# Patient Record
Sex: MALE | Race: WHITE | NOT HISPANIC OR LATINO | Employment: FULL TIME | URBAN - METROPOLITAN AREA
[De-identification: names, ages, dates, MRNs, and addresses within clinical notes are randomized per-mention and may not be internally consistent; named-entity substitution may affect disease eponyms.]

---

## 2017-11-29 ENCOUNTER — HOSPITAL ENCOUNTER (EMERGENCY)
Facility: HOSPITAL | Age: 37
Discharge: HOME | End: 2017-11-29
Attending: EMERGENCY MEDICINE
Payer: MEDICAID

## 2017-11-29 VITALS
RESPIRATION RATE: 18 BRPM | HEART RATE: 110 BPM | DIASTOLIC BLOOD PRESSURE: 103 MMHG | HEIGHT: 72 IN | WEIGHT: 270 LBS | TEMPERATURE: 97 F | BODY MASS INDEX: 36.57 KG/M2 | OXYGEN SATURATION: 95 % | SYSTOLIC BLOOD PRESSURE: 159 MMHG

## 2017-11-29 DIAGNOSIS — R52 PAIN: ICD-10-CM

## 2017-11-29 DIAGNOSIS — S93.401A SPRAIN OF RIGHT ANKLE, UNSPECIFIED LIGAMENT, INITIAL ENCOUNTER: Primary | ICD-10-CM

## 2017-11-29 DIAGNOSIS — M25.571 ACUTE RIGHT ANKLE PAIN: ICD-10-CM

## 2017-11-29 PROCEDURE — 25000003 PHARM REV CODE 250: Performed by: NURSE PRACTITIONER

## 2017-11-29 PROCEDURE — 29515 APPLICATION SHORT LEG SPLINT: CPT | Mod: RT

## 2017-11-29 PROCEDURE — 99283 EMERGENCY DEPT VISIT LOW MDM: CPT | Mod: 25

## 2017-11-29 RX ORDER — HYDROCODONE BITARTRATE AND ACETAMINOPHEN 5; 325 MG/1; MG/1
1 TABLET ORAL
Status: COMPLETED | OUTPATIENT
Start: 2017-11-29 | End: 2017-11-29

## 2017-11-29 RX ORDER — DICLOFENAC SODIUM 50 MG/1
50 TABLET, DELAYED RELEASE ORAL 3 TIMES DAILY PRN
Qty: 20 TABLET | Refills: 0 | Status: ON HOLD | OUTPATIENT
Start: 2017-11-29 | End: 2022-06-01

## 2017-11-29 RX ORDER — TRAMADOL HYDROCHLORIDE 50 MG/1
50 TABLET ORAL EVERY 6 HOURS PRN
Qty: 12 TABLET | Refills: 0 | Status: SHIPPED | OUTPATIENT
Start: 2017-11-29 | End: 2017-12-09

## 2017-11-29 RX ADMIN — HYDROCODONE BITARTRATE AND ACETAMINOPHEN 1 TABLET: 5; 325 TABLET ORAL at 10:11

## 2017-11-30 NOTE — ED PROVIDER NOTES
"SCRIBE #1 NOTE: I, Douglas Rios, am scribing for, and in the presence of, Stoney Villanueva NP. I have scribed the entire note.      History      Chief Complaint   Patient presents with    Ankle Pain     right ankle pain, reports rolling ankle on bottom step        Review of patient's allergies indicates:   Allergen Reactions    Phenergan [promethazine]      "makes my whole body tense up"        HPI   HPI    11/29/2017, 8:58 PM   History obtained from the patient      History of Present Illness: Cl Saunders is a 37 y.o. male patient who presents to the Emergency Department for an evaluation of right ankle pain which onset suddenly today. Symptoms are constant and moderate in severity. Exacerbated by bearing weight and relieved by nothing. Patient denies any weakness/numbness, gait problem, joint swelling, and all other sxs at this time. No further complaints or concerns at this time.       Arrival mode: Personal vehicle    PCP: Primary Doctor No       Past Medical History:  Past Medical History:   Diagnosis Date    H/O ETOH abuse        Past Surgical History:  Past Surgical History:   Procedure Laterality Date    TONSILLECTOMY           Family History:  Family History   Problem Relation Age of Onset    Hypertension Father     Hypertension Maternal Grandmother     Hypertension Maternal Grandfather        Social History:  Social History     Social History Main Topics    Smoking status: Never Smoker    Smokeless tobacco: Current User     Types: Chew    Alcohol use Yes      Comment: "at least a pint or more every day" - 1.5 gallon daily    Drug use:      Types: Marijuana      Comment: "sometimes marijuana"    Sexual activity: Not on file       ROS   Review of Systems   Constitutional: Negative for chills and fever.   HENT: Negative for congestion and sore throat.    Respiratory: Negative for cough and shortness of breath.    Cardiovascular: Negative for chest pain and leg swelling.   Gastrointestinal: " Negative for abdominal pain, nausea and vomiting.   Genitourinary: Negative for dysuria and hematuria.   Musculoskeletal: Positive for arthralgias (Right ankle). Negative for back pain, gait problem and joint swelling.   Skin: Negative for rash and wound.   Neurological: Negative for weakness, light-headedness, numbness and headaches.   Hematological: Does not bruise/bleed easily.     Physical Exam      Initial Vitals [11/29/17 2054]   BP Pulse Resp Temp SpO2   (!) 159/103 110 18 97.3 °F (36.3 °C) 95 %      MAP       121.67          Physical Exam  Nursing Notes and Vital Signs Reviewed.  Constitutional: Patient is in no acute distress. Well-developed and well-nourished.  Head: Atraumatic. Normocephalic.  Eyes: PERRL. EOM intact. Conjunctivae are not pale. No scleral icterus.  ENT: Mucous membranes are moist.   Neck: Supple. Full ROM. No lymphadenopathy.  Cardiovascular: Regular rate. Regular rhythm.  Pulmonary/Chest: No respiratory distress.   Abdominal: Soft and non-distended.   Musculoskeletal: Moves all extremities. No obvious deformities. No edema. No calf tenderness***.  Skin: Warm and dry.  Neurological:  Alert, awake, and appropriate.  Normal speech.  No acute focal neurological deficits are appreciated.  Psychiatric: Normal affect. Good eye contact. Appropriate in content.    ED Course    Procedures  ED Vital Signs:  Vitals:    11/29/17 2054   BP: (!) 159/103   Pulse: 110   Resp: 18   Temp: 97.3 °F (36.3 °C)   TempSrc: Oral   SpO2: 95%   Weight: 122.5 kg (270 lb)   Height: 6' (1.829 m)       Abnormal Lab Results:  Labs Reviewed - No data to display     All Lab Results:  ***    Imaging Results:  Imaging Results    None                 The Emergency Provider reviewed the vital signs and test results, which are outlined above.    ED Discussion     ***    ED Medication(s):  Medications - No data to display    New Prescriptions    No medications on file             Medical Decision Making              Scribe  Attestation:   Scribe #1: I performed the above scribed service and the documentation accurately describes the services I performed. I attest to the accuracy of the note.    Attending:   Physician Attestation Statement for Scribe #1: I, Stoney Villanueva NP, personally performed the services described in this documentation, as scribed by Douglas Rios, in my presence, and it is both accurate and complete.          Clinical Impression       ICD-10-CM ICD-9-CM   1. Pain R52 780.96

## 2017-11-30 NOTE — ED PROVIDER NOTES
"SCRIBE #1 NOTE: I, Douglas Rios, am scribing for, and in the presence of, Stoney Villanueva NP. I have scribed the entire note.      History      Chief Complaint   Patient presents with    Ankle Pain     right ankle pain, reports rolling ankle on bottom step        Review of patient's allergies indicates:   Allergen Reactions    Phenergan [promethazine]      "makes my whole body tense up"        HPI   HPI    11/29/2017, 9:53 PM   History obtained from the patient      History of Present Illness: Cl Saunders is a 37 y.o. male patient who presents to the Emergency Department for an evaluation of right ankle pain which onset suddenly today. Symptoms are constant and moderate in severity. Exacerbated by bearing weight and relieved by nothing. Patient denies any weakness/numbness, gait problem, joint swelling, and all other sxs at this time. No further complaints or concerns at this time.      Arrival mode: Personal vehicle    PCP: Primary Doctor No       Past Medical History:  Past Medical History:   Diagnosis Date    H/O ETOH abuse        Past Surgical History:  Past Surgical History:   Procedure Laterality Date    TONSILLECTOMY           Family History:  Family History   Problem Relation Age of Onset    Hypertension Father     Hypertension Maternal Grandmother     Hypertension Maternal Grandfather        Social History:  Social History     Social History Main Topics    Smoking status: Never Smoker    Smokeless tobacco: Current User     Types: Chew    Alcohol use Yes      Comment: "at least a pint or more every day" - 1.5 gallon daily    Drug use:      Types: Marijuana      Comment: "sometimes marijuana"    Sexual activity: Unknown       ROS   Review of Systems   Constitutional: Negative for chills and fever.   HENT: Negative for congestion and sore throat.    Respiratory: Negative for cough and shortness of breath.    Cardiovascular: Negative for chest pain.   Gastrointestinal: Negative for abdominal " pain, nausea and vomiting.   Genitourinary: Negative for dysuria and hematuria.   Musculoskeletal: Positive for arthralgias. Negative for back pain, gait problem and joint swelling.   Skin: Negative for rash and wound.   Neurological: Negative for weakness, light-headedness, numbness and headaches.   Hematological: Does not bruise/bleed easily.     Physical Exam      Initial Vitals [11/29/17 2054]   BP Pulse Resp Temp SpO2   (!) 159/103 110 18 97.3 °F (36.3 °C) 95 %      MAP       121.67          Physical Exam  Nursing Notes and Vital Signs Reviewed.  Constitutional: Patient is in no acute distress. Well-developed and well-nourished.  Head: Atraumatic. Normocephalic.  Eyes: PERRL. EOM intact. Conjunctivae are not pale. No scleral icterus.  ENT: Mucous membranes are moist. Oropharynx is clear and symmetric.    Neck: Supple. Full ROM. No lymphadenopathy.  Cardiovascular: Regular rate. Regular rhythm.  Pulmonary/Chest: No respiratory distress.   Abdominal: Soft and non-distended.    Musculoskeletal: Moves all extremities. No obvious deformities.   Right Ankle:  Negative Reyna's test. Swelling to right lateral malleolus with tenderness.   He is able to bear weight.   Ankle dorsiflexion and ankle plantar flexion are intact.  Intact sensation to light touch. Distal capillary refill takes less than 2 seconds.  PT and DP pulses are 2+ bilaterally.  Skin: Warm and dry.  Neurological:  Alert, awake, and appropriate.  Normal speech.  No acute focal neurological deficits are appreciated.  Psychiatric: Normal affect. Good eye contact. Appropriate in content.    ED Course    Orthopedic Injury  Date/Time: 11/29/2017 9:55 PM  Performed by: CARLEY CUMMINGS  Authorized by: CARLEY CUMMINGS     Injury:     Injury location:  Ankle    Location details:  Right ankle    Injury type:  Soft tissue      Pre-procedure assessment:     Neurovascular status: Neurovascularly intact      Distal perfusion: normal      Neurological function: normal       Range of motion: normal        Selections made in this section will also lock the Injury type section above.:     Immobilization:  Brace (Walking boot)  Post-procedure assessment:     Neurovascular status: Neurovascularly intact      Patient tolerance:  Patient tolerated the procedure well with no immediate complications      ED Vital Signs:  Vitals:    11/29/17 2054   BP: (!) 159/103   Pulse: 110   Resp: 18   Temp: 97.3 °F (36.3 °C)   TempSrc: Oral   SpO2: 95%   Weight: 122.5 kg (270 lb)   Height: 6' (1.829 m)       Imaging Results:  Imaging Results          X-Ray Ankle Complete Right (Final result)  Result time 11/29/17 21:20:40    Final result by Cl Lemus III, MD (11/29/17 21:20:40)                 Impression:     Soft tissue swelling, greatest laterally. No acute bony abnormality suggested.      Electronically signed by: CL LEMUS MD  Date:     11/29/17  Time:    21:20              Narrative:    Right ankle x-ray, 3 views.    Clinical indication: Right ankle pain.    Soft tissue swelling, greatest laterally. No fracture. No dislocation.                                      The Emergency Provider reviewed the vital signs and test results, which are outlined above.    ED Discussion     9:58 PM: Reassessed pt at this time. Pt is awake, alert, and in NAD. Pt states his condition has improved at this time. Discussed with pt all pertinent ED information and results. Discussed pt dx and plan of tx. Gave pt all f/u and return to the ED instructions. All questions and concerns were addressed at this time. Pt expresses understanding of information and instructions, and is comfortable with plan to discharge. Pt is stable for discharge.    I discussed with patient and/or family/caretaker that evaluation in the ED does not suggest any emergent or life threatening medical conditions requiring immediate intervention beyond what was provided in the ED, and I believe patient is safe for discharge.   Regardless, an unremarkable evaluation in the ED does not preclude the development or presence of a serious of life threatening condition. As such, patient was instructed to return immediately for any worsening or change in current symptoms.      ED Medication(s):  Medications   hydrocodone-acetaminophen 5-325mg per tablet 1 tablet (1 tablet Oral Given 11/29/17 2205)       Discharge Medication List as of 11/29/2017  9:59 PM      START taking these medications    Details   diclofenac (VOLTAREN) 50 MG EC tablet Take 1 tablet (50 mg total) by mouth 3 (three) times daily as needed., Starting Wed 11/29/2017, Print      traMADol (ULTRAM) 50 mg tablet Take 1 tablet (50 mg total) by mouth every 6 (six) hours as needed for Pain., Starting Wed 11/29/2017, Until Sat 12/9/2017, Print             Follow-up Information     [facility]    Specialty:  Emergency Medicine  Why:  As needed, If symptoms worsen  Contact information:  [address] [facility] [address] [phone]           [facility]. Schedule an appointment as soon as possible for a visit in 2 days.    Specialty:  Orthopedics  Contact information:  [address] [facility] [address] [phone]  Additional information:  (off [facility]) 1st floor                   Medical Decision Making    Medical Decision Making:   Clinical Tests:   Radiological Study: Ordered and Reviewed           Scribe Attestation:   Scribe #1: I performed the above scribed service and the documentation accurately describes the services I performed. I attest to the accuracy of the note.    Attending:   Physician Attestation Statement for Scribe #1: I, Stoney Villanueva NP, personally performed the services described in this documentation, as scribed by Douglas Rios, in my presence, and it is both accurate and complete.          Clinical Impression       ICD-10-CM ICD-9-CM   1. Sprain of right ankle, unspecified ligament,  initial encounter S93.401A 845.00   2. Pain R52 780.96   3. Acute right ankle pain M25.571 719.47     338.19       Disposition:   Disposition: Discharged  Condition: Stable         Stoney Villanueva NP  11/30/17 0157

## 2017-12-03 ENCOUNTER — HOSPITAL ENCOUNTER (EMERGENCY)
Facility: HOSPITAL | Age: 37
Discharge: HOME | End: 2017-12-04
Attending: EMERGENCY MEDICINE
Payer: MEDICAID

## 2017-12-03 DIAGNOSIS — R19.7 DIARRHEA, UNSPECIFIED TYPE: ICD-10-CM

## 2017-12-03 DIAGNOSIS — E87.6 HYPOKALEMIA: ICD-10-CM

## 2017-12-03 DIAGNOSIS — R11.2 NON-INTRACTABLE VOMITING WITH NAUSEA, UNSPECIFIED VOMITING TYPE: Primary | ICD-10-CM

## 2017-12-03 LAB
ALBUMIN SERPL BCP-MCNC: 3.6 G/DL
ALP SERPL-CCNC: 68 U/L
ALT SERPL W/O P-5'-P-CCNC: 124 U/L
ANION GAP SERPL CALC-SCNC: 13 MMOL/L
AST SERPL-CCNC: 41 U/L
BASOPHILS # BLD AUTO: 0.01 K/UL
BASOPHILS NFR BLD: 0.1 %
BILIRUB SERPL-MCNC: 1.4 MG/DL
BUN SERPL-MCNC: 12 MG/DL
CALCIUM SERPL-MCNC: 9.2 MG/DL
CHLORIDE SERPL-SCNC: 96 MMOL/L
CO2 SERPL-SCNC: 23 MMOL/L
CREAT SERPL-MCNC: 1.1 MG/DL
DIFFERENTIAL METHOD: ABNORMAL
EOSINOPHIL # BLD AUTO: 0 K/UL
EOSINOPHIL NFR BLD: 0.1 %
ERYTHROCYTE [DISTWIDTH] IN BLOOD BY AUTOMATED COUNT: 12.8 %
EST. GFR  (AFRICAN AMERICAN): >60 ML/MIN/1.73 M^2
EST. GFR  (NON AFRICAN AMERICAN): >60 ML/MIN/1.73 M^2
FLUAV AG SPEC QL IA: NEGATIVE
FLUBV AG SPEC QL IA: NEGATIVE
GLUCOSE SERPL-MCNC: 172 MG/DL
HCT VFR BLD AUTO: 48.9 %
HGB BLD-MCNC: 17.7 G/DL
LYMPHOCYTES # BLD AUTO: 0.5 K/UL
LYMPHOCYTES NFR BLD: 5.8 %
MCH RBC QN AUTO: 33.6 PG
MCHC RBC AUTO-ENTMCNC: 36.2 G/DL
MCV RBC AUTO: 93 FL
MONOCYTES # BLD AUTO: 1.1 K/UL
MONOCYTES NFR BLD: 12.5 %
NEUTROPHILS # BLD AUTO: 7.2 K/UL
NEUTROPHILS NFR BLD: 81.5 %
PLATELET # BLD AUTO: 113 K/UL
PMV BLD AUTO: 10.9 FL
POTASSIUM SERPL-SCNC: 2.8 MMOL/L
PROT SERPL-MCNC: 8.3 G/DL
RBC # BLD AUTO: 5.27 M/UL
SODIUM SERPL-SCNC: 132 MMOL/L
SPECIMEN SOURCE: NORMAL
WBC # BLD AUTO: 8.82 K/UL

## 2017-12-03 PROCEDURE — 85025 COMPLETE CBC W/AUTO DIFF WBC: CPT

## 2017-12-03 PROCEDURE — 96361 HYDRATE IV INFUSION ADD-ON: CPT

## 2017-12-03 PROCEDURE — 87400 INFLUENZA A/B EACH AG IA: CPT | Mod: 59

## 2017-12-03 PROCEDURE — 63600175 PHARM REV CODE 636 W HCPCS: Performed by: EMERGENCY MEDICINE

## 2017-12-03 PROCEDURE — 25000003 PHARM REV CODE 250: Performed by: EMERGENCY MEDICINE

## 2017-12-03 PROCEDURE — 80053 COMPREHEN METABOLIC PANEL: CPT

## 2017-12-03 PROCEDURE — 99283 EMERGENCY DEPT VISIT LOW MDM: CPT | Mod: 25

## 2017-12-03 PROCEDURE — 96374 THER/PROPH/DIAG INJ IV PUSH: CPT

## 2017-12-03 RX ORDER — ONDANSETRON 2 MG/ML
4 INJECTION INTRAMUSCULAR; INTRAVENOUS
Status: COMPLETED | OUTPATIENT
Start: 2017-12-03 | End: 2017-12-03

## 2017-12-03 RX ORDER — DEXTROAMPHETAMINE SACCHARATE, AMPHETAMINE ASPARTATE, DEXTROAMPHETAMINE SULFATE AND AMPHETAMINE SULFATE 7.5; 7.5; 7.5; 7.5 MG/1; MG/1; MG/1; MG/1
TABLET ORAL 2 TIMES DAILY
Status: ON HOLD | COMMUNITY
End: 2022-06-01

## 2017-12-03 RX ORDER — POTASSIUM CHLORIDE 20 MEQ/1
40 TABLET, EXTENDED RELEASE ORAL
Status: COMPLETED | OUTPATIENT
Start: 2017-12-03 | End: 2017-12-03

## 2017-12-03 RX ORDER — ONDANSETRON 4 MG/1
4 TABLET, ORALLY DISINTEGRATING ORAL EVERY 6 HOURS PRN
Qty: 15 TABLET | Refills: 0 | Status: ON HOLD | OUTPATIENT
Start: 2017-12-03 | End: 2022-06-01

## 2017-12-03 RX ADMIN — SODIUM CHLORIDE 1000 ML: 0.9 INJECTION, SOLUTION INTRAVENOUS at 10:12

## 2017-12-03 RX ADMIN — POTASSIUM CHLORIDE 40 MEQ: 1500 TABLET, EXTENDED RELEASE ORAL at 11:12

## 2017-12-03 RX ADMIN — ONDANSETRON HYDROCHLORIDE 4 MG: 2 INJECTION, SOLUTION INTRAMUSCULAR; INTRAVENOUS at 10:12

## 2017-12-04 VITALS
SYSTOLIC BLOOD PRESSURE: 132 MMHG | TEMPERATURE: 99 F | RESPIRATION RATE: 18 BRPM | BODY MASS INDEX: 35.34 KG/M2 | DIASTOLIC BLOOD PRESSURE: 81 MMHG | WEIGHT: 260.94 LBS | HEART RATE: 98 BPM | OXYGEN SATURATION: 98 % | HEIGHT: 72 IN

## 2017-12-14 ENCOUNTER — HOSPITAL ENCOUNTER (OUTPATIENT)
Dept: RADIOLOGY | Facility: HOSPITAL | Age: 37
Discharge: HOME | End: 2017-12-14
Attending: PHYSICIAN ASSISTANT
Payer: MEDICAID

## 2017-12-14 ENCOUNTER — OFFICE VISIT (OUTPATIENT)
Dept: ORTHOPEDICS | Facility: CLINIC | Age: 37
End: 2017-12-14
Payer: MEDICAID

## 2017-12-14 VITALS
HEIGHT: 72 IN | DIASTOLIC BLOOD PRESSURE: 84 MMHG | WEIGHT: 260.81 LBS | BODY MASS INDEX: 35.33 KG/M2 | SYSTOLIC BLOOD PRESSURE: 121 MMHG | HEART RATE: 101 BPM

## 2017-12-14 DIAGNOSIS — M25.571 ACUTE RIGHT ANKLE PAIN: ICD-10-CM

## 2017-12-14 DIAGNOSIS — M25.561 RIGHT MEDIAL KNEE PAIN: ICD-10-CM

## 2017-12-14 DIAGNOSIS — M25.571 ACUTE RIGHT ANKLE PAIN: Primary | ICD-10-CM

## 2017-12-14 PROCEDURE — 73560 X-RAY EXAM OF KNEE 1 OR 2: CPT | Mod: TC,PO,LT

## 2017-12-14 PROCEDURE — 73610 X-RAY EXAM OF ANKLE: CPT | Mod: 26,RT,, | Performed by: RADIOLOGY

## 2017-12-14 PROCEDURE — 73560 X-RAY EXAM OF KNEE 1 OR 2: CPT | Mod: 26,59,LT, | Performed by: RADIOLOGY

## 2017-12-14 PROCEDURE — 99999 PR PBB SHADOW E&M-EST. PATIENT-LVL IV: CPT | Mod: PBBFAC,,, | Performed by: PHYSICIAN ASSISTANT

## 2017-12-14 PROCEDURE — 99214 OFFICE O/P EST MOD 30 MIN: CPT | Mod: PBBFAC,25,PO | Performed by: PHYSICIAN ASSISTANT

## 2017-12-14 PROCEDURE — 73610 X-RAY EXAM OF ANKLE: CPT | Mod: TC,PO,RT

## 2017-12-14 PROCEDURE — 73562 X-RAY EXAM OF KNEE 3: CPT | Mod: 26,RT,, | Performed by: RADIOLOGY

## 2017-12-14 PROCEDURE — 99204 OFFICE O/P NEW MOD 45 MIN: CPT | Mod: S$PBB,,, | Performed by: PHYSICIAN ASSISTANT

## 2017-12-14 RX ORDER — DIAZEPAM 10 MG/1
10 TABLET ORAL ONCE
Qty: 1 TABLET | Refills: 0 | Status: SHIPPED | OUTPATIENT
Start: 2017-12-14 | End: 2018-01-02

## 2017-12-14 NOTE — PATIENT INSTRUCTIONS

## 2017-12-14 NOTE — PROGRESS NOTES
"CC: 38 y/o male right ankle pain    Date of Injury: 11/29/2017    HPI: On the date above, the patient rolled his ankle while stepping on uneven pavement.  A pop in his ankle.  He reported to the emergency room.  He continues to have a dull stabbing aching pain in the ankle.  Pain is 8 on a 10 scale.  He has tried NSAIDs with minimal relief.  Pain decreases at rest, pain increases with any activity.  He does work standing on it for long hours.    PMH:    Past Medical History:   Diagnosis Date    H/O ETOH abuse     Hypertension        PSH:    Past Surgical History:   Procedure Laterality Date    TONSILLECTOMY         Family Hx:    Family History   Problem Relation Age of Onset    Hypertension Father     Hypertension Maternal Grandmother     Hypertension Maternal Grandfather        Allergy:  Review of patient's allergies indicates:  No Known Allergies    Medication:    Current Outpatient Prescriptions:     dextroamphetamine-amphetamine (ADDERALL) 30 mg Tab, Take by mouth 2 (two) times daily., Disp: , Rfl:     diclofenac (VOLTAREN) 50 MG EC tablet, Take 1 tablet (50 mg total) by mouth 3 (three) times daily as needed., Disp: 20 tablet, Rfl: 0    LOSARTAN POTASSIUM (LOSARTAN ORAL), Take by mouth once daily. , Disp: , Rfl:     ondansetron (ZOFRAN-ODT) 4 MG TbDL, Take 1 tablet (4 mg total) by mouth every 6 (six) hours as needed., Disp: 15 tablet, Rfl: 0    amlodipine (NORVASC) 5 MG tablet, Take 1 tablet (5 mg total) by mouth once daily., Disp: 30 tablet, Rfl: 1    Social History:    Social History     Social History    Marital status:      Spouse name: N/A    Number of children: N/A    Years of education: N/A     Occupational History    [employer]      Social History Main Topics    Smoking status: Never Smoker    Smokeless tobacco: Current User     Types: Chew    Alcohol use 8.4 oz/week     14 Glasses of wine per week    Drug use: Yes     Types: Marijuana      Comment: "sometimes marijuana"    " Sexual activity: Not on file     Other Topics Concern    Not on file     Social History Narrative    Lives with spouse and children           Vitals:   /84 (BP Location: Left arm, Patient Position: Sitting, BP Method: Large (Automatic))   Pulse 101   Ht 6' (1.829 m)   Wt 118.3 kg (260 lb 12.9 oz)   BMI 35.37 kg/m²      ROS:  GENERAL: No fever, chills, fatigability or weight loss.  SKIN: No rashes, itching or changes in color or texture of skin.  HEAD: No headaches or recent head trauma.  EYES: Visual acuity fine. No photophobia, ocular pain or diplopia.  EARS: Denies ear pain, discharge or vertigo.  NOSE: No loss of smell, no epistaxis or postnasal drip.  MOUTH & THROAT: No hoarseness or change in voice. No excessive gum bleeding.  NODES: Denies swollen glands.  CHEST: Denies RIOS, cyanosis, wheezing, cough and sputum production.  CARDIOVASCULAR: Denies chest pain, PND, orthopnea or reduced exercise tolerance.  ABDOMEN: Appetite fine. No weight loss. Denies diarrhea, abdominal pain, hematemesis or blood in stool.  URINARY: No flank pain, dysuria or hematuria.  PERIPHERAL VASCULAR: No claudication or cyanosis.  NEUROLOGIC: No history of seizures, paralysis, alteration of gait or coordination.  MUSCULOSKELETAL: See HPI    PE:  APPEARANCE: Well nourished, well developed, in no acute distress.   HEAD: Normocephalic, atraumatic.  NEUROLOGIC: Cranial Nerves: II-XII grossly intact, also see MUSCULOSKELETAL  MUSCULOSKELETAL: .  Assessment:           Diagnosis:              1.Right ankle strain                 Diagnostic Studies  MRI-Yes  X-Ray-Yes agree with Findings: There is no radiographic evidence of acute osseous, articular, or soft tissue abnormality. Joint spaces are well preserved  EMG/NCV-No  Arthrogram-No  Bone Scan-No  CT Scan-No  Doppler-No  ESR-No  CRP-No  CBC with Diff-No   Rheumatoid/Arthritis Panel-No      Plan:                                                 1. PT-no                                                  2.OT-no                                          3.NSAID-no                                        4. Narcotics-no                                     5. Wound care-N/A                                 6. Rest-no                                           7. Surgery-no                                         8. JACQUELINE Hose-no                                    9. Anticoagulation therapy-no               10. Elevation-no                                     11. Crutches-no                                    12. Walker-no             13. Cane no                        14. Referral-no                                     15.Injection-no                            16. Splint   /    Cast   /   Cast Shoe-Yes  Ankle splint given            17. RICE            18. Follow up-  After MRI

## 2017-12-14 NOTE — LETTER
December 14, 2017      Dejan Stubbs MD  [address] [place] [address] [address] [place] [address]           [facility]  [address] [facility] [place] [address]  Phone: [phone]  Fax: [phone]          Patient: Cl Saunders   MR Number: [id number]   YOB: 1980   Date of Visit: 12/14/2017       Dear Dr. Dejan Stubbs:    Thank you for referring Cl Saunders to me for evaluation. Attached you will find relevant portions of my assessment and plan of care.    If you have questions, please do not hesitate to call me. I look forward to following Cl Saunders along with you.    Sincerely,    Matt Shane PA-C    Enclosure  CC:  No Recipients    If you would like to receive this communication electronically, please contact [email or web address] or [phone] to request more information on [employer] Link access.    For providers and/or their staff who would like to refer a patient to [facility], please contact us through our one-stop-shop provider referral line, [facility], at [phone].    If you feel you have received this communication in error or would no longer like to receive these types of communications, please e-mail [email or web address]

## 2017-12-22 ENCOUNTER — TELEPHONE (OUTPATIENT)
Dept: RADIOLOGY | Facility: HOSPITAL | Age: 37
End: 2017-12-22

## 2017-12-28 ENCOUNTER — HOSPITAL ENCOUNTER (OUTPATIENT)
Dept: RADIOLOGY | Facility: HOSPITAL | Age: 37
Discharge: HOME | End: 2017-12-28
Attending: PHYSICIAN ASSISTANT
Payer: MEDICAID

## 2017-12-28 DIAGNOSIS — M25.571 ACUTE RIGHT ANKLE PAIN: ICD-10-CM

## 2017-12-28 PROCEDURE — 73721 MRI JNT OF LWR EXTRE W/O DYE: CPT | Mod: TC,PO,RT

## 2017-12-28 PROCEDURE — 73721 MRI JNT OF LWR EXTRE W/O DYE: CPT | Mod: 26,RT,, | Performed by: RADIOLOGY

## 2018-01-02 ENCOUNTER — OFFICE VISIT (OUTPATIENT)
Dept: ORTHOPEDICS | Facility: CLINIC | Age: 38
End: 2018-01-02
Payer: MEDICAID

## 2018-01-02 VITALS
HEIGHT: 72 IN | DIASTOLIC BLOOD PRESSURE: 95 MMHG | HEART RATE: 66 BPM | SYSTOLIC BLOOD PRESSURE: 142 MMHG | WEIGHT: 260.81 LBS | BODY MASS INDEX: 35.33 KG/M2

## 2018-01-02 DIAGNOSIS — S96.911D: Primary | ICD-10-CM

## 2018-01-02 PROCEDURE — 99213 OFFICE O/P EST LOW 20 MIN: CPT | Mod: PBBFAC,PO | Performed by: PHYSICIAN ASSISTANT

## 2018-01-02 PROCEDURE — 99999 PR PBB SHADOW E&M-EST. PATIENT-LVL III: CPT | Mod: PBBFAC,,, | Performed by: PHYSICIAN ASSISTANT

## 2018-01-02 PROCEDURE — 99213 OFFICE O/P EST LOW 20 MIN: CPT | Mod: S$PBB,,, | Performed by: PHYSICIAN ASSISTANT

## 2018-01-02 RX ORDER — ZOLPIDEM TARTRATE 10 MG/1
10 TABLET ORAL NIGHTLY PRN
Refills: 2 | Status: ON HOLD | COMMUNITY
Start: 2017-11-04 | End: 2022-06-01

## 2018-01-02 RX ORDER — NABUMETONE 750 MG/1
750 TABLET, FILM COATED ORAL 2 TIMES DAILY PRN
Qty: 60 TABLET | Refills: 0 | Status: ON HOLD | OUTPATIENT
Start: 2018-01-02 | End: 2022-06-01

## 2018-01-02 NOTE — LETTER
January 2, 2018      [facility]  [address] [facility] [place] [address]  Phone: [phone]  Fax: [phone]       Patient: Cl Saunders   YOB: 1980  Date of Visit: 01/02/2018    To Whom It May Concern:    Constance Saunders  was at [facility] on 01/02/2018. He may return to work/school on  2 Jan 2018 with no restrictions. If you have any questions or concerns, or if I can be of further assistance, please do not hesitate to contact me.    Sincerely,        Matt Shane PA-C

## 2018-01-02 NOTE — PROGRESS NOTES
CC: 38 y/o male right ankle pain    Date of Injury: 11/29/2017    HPI: On the date above, the patient rolled his ankle while stepping on uneven pavement.  A pop in his ankle.  He reported to the emergency room.  He continues to have a dull stabbing aching pain in the ankle.  Pain is 8 on a 10 scale.  He has tried NSAIDs with minimal relief.  Pain decreases at rest, pain increases with any activity.  He does work standing on it for 10-14 hours per day.    PMH:    Past Medical History:   Diagnosis Date    H/O ETOH abuse     Hypertension        PSH:    Past Surgical History:   Procedure Laterality Date    TONSILLECTOMY         Family Hx:    Family History   Problem Relation Age of Onset    Hypertension Father     Hypertension Maternal Grandmother     Hypertension Maternal Grandfather        Allergy:  Review of patient's allergies indicates:  No Known Allergies    Medication:    Current Outpatient Prescriptions:     dextroamphetamine-amphetamine (ADDERALL) 30 mg Tab, Take by mouth 2 (two) times daily., Disp: , Rfl:     diclofenac (VOLTAREN) 50 MG EC tablet, Take 1 tablet (50 mg total) by mouth 3 (three) times daily as needed., Disp: 20 tablet, Rfl: 0    LOSARTAN POTASSIUM (LOSARTAN ORAL), Take by mouth once daily. , Disp: , Rfl:     ondansetron (ZOFRAN-ODT) 4 MG TbDL, Take 1 tablet (4 mg total) by mouth every 6 (six) hours as needed., Disp: 15 tablet, Rfl: 0    zolpidem (AMBIEN) 10 mg Tab, Take 10 mg by mouth nightly as needed., Disp: , Rfl: 2    amlodipine (NORVASC) 5 MG tablet, Take 1 tablet (5 mg total) by mouth once daily., Disp: 30 tablet, Rfl: 1    nabumetone (RELAFEN) 750 MG tablet, Take 1 tablet (750 mg total) by mouth 2 (two) times daily as needed for Pain., Disp: 60 tablet, Rfl: 0    Social History:    Social History     Social History    Marital status:      Spouse name: N/A    Number of children: N/A    Years of education: N/A     Occupational History    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      Social History  "Main Topics    Smoking status: Never Smoker    Smokeless tobacco: Current User     Types: Chew    Alcohol use 8.4 oz/week     14 Glasses of wine per week    Drug use: Yes     Types: Marijuana      Comment: "sometimes marijuana"    Sexual activity: Not on file     Other Topics Concern    Not on file     Social History Narrative    Lives with spouse and children           Vitals:   BP (!) 142/95 (BP Location: Right arm, Patient Position: Sitting, BP Method: Large (Automatic))   Pulse 66   Ht 6' (1.829 m)   Wt 118.3 kg (260 lb 12.9 oz)   BMI 35.37 kg/m²      ROS:  GENERAL: No fever, chills, fatigability or weight loss.  SKIN: No rashes, itching or changes in color or texture of skin.  HEAD: No headaches or recent head trauma.  EYES: Visual acuity fine. No photophobia, ocular pain or diplopia.  EARS: Denies ear pain, discharge or vertigo.  NOSE: No loss of smell, no epistaxis or postnasal drip.  MOUTH & THROAT: No hoarseness or change in voice. No excessive gum bleeding.  NODES: Denies swollen glands.  CHEST: Denies RIOS, cyanosis, wheezing, cough and sputum production.  CARDIOVASCULAR: Denies chest pain, PND, orthopnea or reduced exercise tolerance.  ABDOMEN: Appetite fine. No weight loss. Denies diarrhea, abdominal pain, hematemesis or blood in stool.  URINARY: No flank pain, dysuria or hematuria.  PERIPHERAL VASCULAR: No claudication or cyanosis.  NEUROLOGIC: No history of seizures, paralysis, alteration of gait or coordination.  MUSCULOSKELETAL: See HPI    PE:  APPEARANCE: Well nourished, well developed, in no acute distress.   HEAD: Normocephalic, atraumatic.  NEUROLOGIC: Cranial Nerves: II-XII grossly intact, also see MUSCULOSKELETAL  MUSCULOSKELETAL: .  Assessment:           Diagnosis:              1.Right ankle strain                 Diagnostic Studies  MRI-Yes agree with the findings of There is an ankle joint effusion present. No fracture or dislocation or contusion.. From normal fat is seen in the " sinus tarsi. Achilles tendon is intact. No evidence for plantar fasciitis. The visualized extensor and flexor tendons appear intact. Anterior and posterior talofibular ligaments appear intact. There is edema within the subcutaneous soft tissues about the ankle.    X-Ray-Yes agree with Findings: There is no radiographic evidence of acute osseous, articular, or soft tissue abnormality. Joint spaces are well preserved      Plan:                                                 1. PT-no                                                 2.OT-no                                          3.NSAID-yes, Relafen 750 mg twice a day as needed                                      4. Narcotics-no                                     5. Wound care-N/A                                 6. Rest-no                                           7. Surgery-no                                         8. JACQUELINE Hose-no                                    9. Anticoagulation therapy-no               10. Elevation-no                                     11. Crutches-no                                    12. Walker-no             13. Cane no                        14. Referral-no                                     15.Injection-no                            16. Splint   /    Cast   /   Cast Shoe-Yes  Ankle splint given            17. RICE            18. Follow up-  4 weeks for recheck

## 2018-01-30 ENCOUNTER — OFFICE VISIT (OUTPATIENT)
Dept: ORTHOPEDICS | Facility: CLINIC | Age: 38
End: 2018-01-30
Payer: MEDICAID

## 2018-01-30 VITALS
HEIGHT: 72 IN | RESPIRATION RATE: 18 BRPM | WEIGHT: 250 LBS | SYSTOLIC BLOOD PRESSURE: 144 MMHG | DIASTOLIC BLOOD PRESSURE: 88 MMHG | HEART RATE: 67 BPM | BODY MASS INDEX: 33.86 KG/M2

## 2018-01-30 DIAGNOSIS — S93.401A SPRAIN OF RIGHT ANKLE, UNSPECIFIED LIGAMENT, INITIAL ENCOUNTER: Primary | ICD-10-CM

## 2018-01-30 DIAGNOSIS — S86.311A STRAIN OF PERONEAL TENDON OF RIGHT FOOT, INITIAL ENCOUNTER: ICD-10-CM

## 2018-01-30 PROCEDURE — 99214 OFFICE O/P EST MOD 30 MIN: CPT | Mod: S$PBB,,, | Performed by: ORTHOPAEDIC SURGERY

## 2018-01-30 PROCEDURE — 99213 OFFICE O/P EST LOW 20 MIN: CPT | Mod: PBBFAC,PO | Performed by: ORTHOPAEDIC SURGERY

## 2018-01-30 PROCEDURE — 99999 PR PBB SHADOW E&M-EST. PATIENT-LVL III: CPT | Mod: PBBFAC,,, | Performed by: ORTHOPAEDIC SURGERY

## 2018-01-30 PROCEDURE — 3008F BODY MASS INDEX DOCD: CPT | Mod: ,,, | Performed by: ORTHOPAEDIC SURGERY

## 2018-01-31 PROBLEM — S93.401A SPRAIN OF RIGHT ANKLE: Status: ACTIVE | Noted: 2018-01-31

## 2018-01-31 PROBLEM — S86.311A STRAIN OF PERONEAL TENDON OF RIGHT FOOT: Status: ACTIVE | Noted: 2018-01-31

## 2018-01-31 NOTE — PATIENT INSTRUCTIONS
ACE Wrap  Minor muscle or joint injuries are often treated with an elastic bandage. The bandage provides support and compression to the injured area. An elastic bandage is a stretchy, rolled bandage. Elastic bandages range in width from 2 to 6 inches. They can be used for a variety of injuries. The bandages are often called ACE bandages, after the most common brand name.  If used correctly, elastic bandages help control swelling and ease pain. An elastic bandage is also a good reminder not to overuse the injured area. However, elastic bandages do not provide a lot of support and will not prevent reinjury.  Home care    To apply an elastic bandage:  · Check the skin before wrapping the injury. It should be clean, dry, and free of drainage.  · Start wrapping below the injury and work your way toward the body. For an ankle sprain, start wrapping around the foot and work up toward the calf. This will help control swelling.  · Overlap the edges of the bandage so it stays snuggly in place.  · Wrap the bandage firmly, but not too tightly. A tight bandage can increase swelling on either end of the bandage. Make sure the bandage is wrinkle free.  · Leave fingers and toes exposed.  · Secure ends of the bandage (even self-sticking ones) with clips or tape.  · Check frequently to ensure adequate circulation, especially in the fingers and toes. Loosen the bandage if there is local swelling, numbness, tingling, discomfort, coldness, or discoloration (skin pale or bluish in color).  · Rewrap the bandage as needed during the day. Reroll the bandage as you unwind it.  Continue using the elastic bandage until the pain and swelling are gone or as your healthcare provider advises.  If you have been told to ice the area, the ice can be secured in place with the elastic bandage. Wrap the ice pack with a thin towel to protect the skin. Do not put ice or an ice pack directly on the skin.  Ice the area for no more than 20 minutes at a  time.    Follow-up care  Follow up with your healthcare provider, as advised.  When to seek medical advice  Call your healthcare provider for any of the following:  · Pain and swelling that doesn't get better or gets worse  · Trouble moving injured area  · Skin discoloration, numbness, or tingling that doesnt go away after bandage is removed  Date Last Reviewed: 9/13/2015  © 1726-3280 [employer]. [address] [place] [address], [place] [address]. All rights reserved. This information is not intended as a substitute for professional medical care. Always follow your healthcare professional's instructions.

## 2018-08-27 ENCOUNTER — HOSPITAL ENCOUNTER (EMERGENCY)
Facility: HOSPITAL | Age: 38
Discharge: HOME | End: 2018-08-28
Attending: EMERGENCY MEDICINE
Payer: MEDICAID

## 2018-08-27 DIAGNOSIS — J40 BRONCHITIS: Primary | ICD-10-CM

## 2018-08-27 DIAGNOSIS — R05.9 COUGH: ICD-10-CM

## 2018-08-27 DIAGNOSIS — E86.0 DEHYDRATION: ICD-10-CM

## 2018-08-27 LAB
ALBUMIN SERPL BCP-MCNC: 4.7 G/DL
ALP SERPL-CCNC: 81 U/L
ALT SERPL W/O P-5'-P-CCNC: 190 U/L
ANION GAP SERPL CALC-SCNC: 15 MMOL/L
ANISOCYTOSIS BLD QL SMEAR: SLIGHT
APTT BLDCRRT: 27.5 SEC
AST SERPL-CCNC: 72 U/L
BASOPHILS # BLD AUTO: 0.01 K/UL
BASOPHILS NFR BLD: 0.1 %
BILIRUB SERPL-MCNC: 0.8 MG/DL
BILIRUB UR QL STRIP: NEGATIVE
BNP SERPL-MCNC: <10 PG/ML
BUN SERPL-MCNC: 15 MG/DL
CALCIUM SERPL-MCNC: 10.2 MG/DL
CHLORIDE SERPL-SCNC: 102 MMOL/L
CLARITY UR: CLEAR
CO2 SERPL-SCNC: 21 MMOL/L
COLOR UR: YELLOW
CREAT SERPL-MCNC: 0.9 MG/DL
DACRYOCYTES BLD QL SMEAR: ABNORMAL
DIFFERENTIAL METHOD: ABNORMAL
EOSINOPHIL # BLD AUTO: 0 K/UL
EOSINOPHIL NFR BLD: 0.1 %
ERYTHROCYTE [DISTWIDTH] IN BLOOD BY AUTOMATED COUNT: 13.4 %
EST. GFR  (AFRICAN AMERICAN): >60 ML/MIN/1.73 M^2
EST. GFR  (NON AFRICAN AMERICAN): >60 ML/MIN/1.73 M^2
FLUAV AG SPEC QL IA: NEGATIVE
FLUBV AG SPEC QL IA: NEGATIVE
GLUCOSE SERPL-MCNC: 103 MG/DL
GLUCOSE UR QL STRIP: NEGATIVE
HCT VFR BLD AUTO: 54.1 %
HGB BLD-MCNC: 20.1 G/DL
HGB UR QL STRIP: NEGATIVE
INR PPP: 1
KETONES UR QL STRIP: NEGATIVE
LACTATE SERPL-SCNC: 1.5 MMOL/L
LEUKOCYTE ESTERASE UR QL STRIP: NEGATIVE
LYMPHOCYTES # BLD AUTO: 1.3 K/UL
LYMPHOCYTES NFR BLD: 12.9 %
MCH RBC QN AUTO: 34.7 PG
MCHC RBC AUTO-ENTMCNC: 37.2 G/DL
MCV RBC AUTO: 93 FL
MONOCYTES # BLD AUTO: 0.7 K/UL
MONOCYTES NFR BLD: 6.8 %
NEUTROPHILS # BLD AUTO: 7.8 K/UL
NEUTROPHILS NFR BLD: 80.7 %
NITRITE UR QL STRIP: NEGATIVE
OVALOCYTES BLD QL SMEAR: ABNORMAL
PH UR STRIP: 6 [PH] (ref 5–8)
PLATELET # BLD AUTO: 200 K/UL
PLATELET BLD QL SMEAR: ABNORMAL
PMV BLD AUTO: 9.9 FL
POIKILOCYTOSIS BLD QL SMEAR: SLIGHT
POLYCHROMASIA BLD QL SMEAR: ABNORMAL
POTASSIUM SERPL-SCNC: 4 MMOL/L
PROCALCITONIN SERPL IA-MCNC: 0.06 NG/ML
PROT SERPL-MCNC: 8.8 G/DL
PROT UR QL STRIP: NEGATIVE
PROTHROMBIN TIME: 10.1 SEC
RBC # BLD AUTO: 5.8 M/UL
SODIUM SERPL-SCNC: 138 MMOL/L
SP GR UR STRIP: 1.02 (ref 1–1.03)
SPECIMEN SOURCE: NORMAL
STOMATOCYTES BLD QL SMEAR: PRESENT
TROPONIN I SERPL DL<=0.01 NG/ML-MCNC: 0.02 NG/ML
URN SPEC COLLECT METH UR: NORMAL
UROBILINOGEN UR STRIP-ACNC: NEGATIVE EU/DL
WBC # BLD AUTO: 9.72 K/UL

## 2018-08-27 PROCEDURE — 96365 THER/PROPH/DIAG IV INF INIT: CPT | Mod: 59

## 2018-08-27 PROCEDURE — 83605 ASSAY OF LACTIC ACID: CPT

## 2018-08-27 PROCEDURE — 84484 ASSAY OF TROPONIN QUANT: CPT

## 2018-08-27 PROCEDURE — 84145 PROCALCITONIN (PCT): CPT

## 2018-08-27 PROCEDURE — 99285 EMERGENCY DEPT VISIT HI MDM: CPT | Mod: 25

## 2018-08-27 PROCEDURE — 85610 PROTHROMBIN TIME: CPT

## 2018-08-27 PROCEDURE — 25000003 PHARM REV CODE 250: Performed by: EMERGENCY MEDICINE

## 2018-08-27 PROCEDURE — 83880 ASSAY OF NATRIURETIC PEPTIDE: CPT

## 2018-08-27 PROCEDURE — 25000242 PHARM REV CODE 250 ALT 637 W/ HCPCS: Performed by: EMERGENCY MEDICINE

## 2018-08-27 PROCEDURE — 96367 TX/PROPH/DG ADDL SEQ IV INF: CPT

## 2018-08-27 PROCEDURE — 94640 AIRWAY INHALATION TREATMENT: CPT

## 2018-08-27 PROCEDURE — 81003 URINALYSIS AUTO W/O SCOPE: CPT

## 2018-08-27 PROCEDURE — 63600175 PHARM REV CODE 636 W HCPCS: Performed by: EMERGENCY MEDICINE

## 2018-08-27 PROCEDURE — 85025 COMPLETE CBC W/AUTO DIFF WBC: CPT

## 2018-08-27 PROCEDURE — 87040 BLOOD CULTURE FOR BACTERIA: CPT | Mod: 59

## 2018-08-27 PROCEDURE — 93005 ELECTROCARDIOGRAM TRACING: CPT

## 2018-08-27 PROCEDURE — 25500020 PHARM REV CODE 255: Performed by: EMERGENCY MEDICINE

## 2018-08-27 PROCEDURE — 80053 COMPREHEN METABOLIC PANEL: CPT

## 2018-08-27 PROCEDURE — 87400 INFLUENZA A/B EACH AG IA: CPT

## 2018-08-27 PROCEDURE — 93010 ELECTROCARDIOGRAM REPORT: CPT | Mod: ,,, | Performed by: INTERNAL MEDICINE

## 2018-08-27 PROCEDURE — 85730 THROMBOPLASTIN TIME PARTIAL: CPT

## 2018-08-27 RX ORDER — IPRATROPIUM BROMIDE AND ALBUTEROL SULFATE 2.5; .5 MG/3ML; MG/3ML
3 SOLUTION RESPIRATORY (INHALATION)
Status: COMPLETED | OUTPATIENT
Start: 2018-08-27 | End: 2018-08-27

## 2018-08-27 RX ORDER — ACETAMINOPHEN 500 MG
1000 TABLET ORAL
Status: COMPLETED | OUTPATIENT
Start: 2018-08-27 | End: 2018-08-27

## 2018-08-27 RX ADMIN — AZITHROMYCIN MONOHYDRATE 500 MG: 500 INJECTION, POWDER, LYOPHILIZED, FOR SOLUTION INTRAVENOUS at 10:08

## 2018-08-27 RX ADMIN — ACETAMINOPHEN 1000 MG: 500 TABLET ORAL at 10:08

## 2018-08-27 RX ADMIN — SODIUM CHLORIDE 3612 ML: 0.9 INJECTION, SOLUTION INTRAVENOUS at 09:08

## 2018-08-27 RX ADMIN — IPRATROPIUM BROMIDE AND ALBUTEROL SULFATE 3 ML: .5; 3 SOLUTION RESPIRATORY (INHALATION) at 10:08

## 2018-08-27 RX ADMIN — IOHEXOL 100 ML: 350 INJECTION, SOLUTION INTRAVENOUS at 11:08

## 2018-08-27 RX ADMIN — CEFTRIAXONE 1 G: 1 INJECTION, SOLUTION INTRAVENOUS at 09:08

## 2018-08-28 VITALS
RESPIRATION RATE: 23 BRPM | BODY MASS INDEX: 35.93 KG/M2 | OXYGEN SATURATION: 90 % | DIASTOLIC BLOOD PRESSURE: 68 MMHG | HEIGHT: 72 IN | WEIGHT: 265.31 LBS | SYSTOLIC BLOOD PRESSURE: 112 MMHG | TEMPERATURE: 98 F | HEART RATE: 79 BPM

## 2018-08-28 LAB
BASOPHILS # BLD AUTO: 0.01 K/UL
BASOPHILS NFR BLD: 0.2 %
DIFFERENTIAL METHOD: ABNORMAL
EOSINOPHIL # BLD AUTO: 0 K/UL
EOSINOPHIL NFR BLD: 0.2 %
ERYTHROCYTE [DISTWIDTH] IN BLOOD BY AUTOMATED COUNT: 13.5 %
HCT VFR BLD AUTO: 46.6 %
HGB BLD-MCNC: 16.6 G/DL
LYMPHOCYTES # BLD AUTO: 1.1 K/UL
LYMPHOCYTES NFR BLD: 16.9 %
MCH RBC QN AUTO: 33.7 PG
MCHC RBC AUTO-ENTMCNC: 35.6 G/DL
MCV RBC AUTO: 95 FL
MONOCYTES # BLD AUTO: 0.6 K/UL
MONOCYTES NFR BLD: 9.5 %
NEUTROPHILS # BLD AUTO: 4.9 K/UL
NEUTROPHILS NFR BLD: 73.2 %
PLATELET # BLD AUTO: 148 K/UL
PMV BLD AUTO: 9.7 FL
RBC # BLD AUTO: 4.93 M/UL
WBC # BLD AUTO: 6.62 K/UL

## 2018-08-28 PROCEDURE — 85025 COMPLETE CBC W/AUTO DIFF WBC: CPT

## 2018-08-28 RX ORDER — LEVOFLOXACIN 500 MG/1
500 TABLET, FILM COATED ORAL DAILY
Qty: 10 TABLET | Refills: 0 | Status: SHIPPED | OUTPATIENT
Start: 2018-08-28 | End: 2018-09-07

## 2018-08-28 RX ORDER — ALBUTEROL SULFATE 90 UG/1
2 AEROSOL, METERED RESPIRATORY (INHALATION) EVERY 6 HOURS PRN
Qty: 18 G | Refills: 11 | Status: ON HOLD | OUTPATIENT
Start: 2018-08-28 | End: 2022-06-01

## 2018-08-28 NOTE — ED NOTES
Report taken from Katarzyna PETERSEN.  Patient AAo x 4 lying on stretcher.  Vitals stable.  Has nonproductive, hacking cough.  BBS coarse to auscultation.  On room air sats 92%.  Stach.  HR 105bpm.  3000ml fluid bolus infusing and antibx also infusing as ordered.  Call bell in reach.  Watching TV.  Temp = 98

## 2018-08-28 NOTE — ED PROVIDER NOTES
"SCRIBE #1 NOTE: I, Jennyfer Carolin, am scribing for, and in the presence of, Patel August MD. I have scribed the entire note.      History      Chief Complaint   Patient presents with    Cough     patient states he was diagnosed with pneumonia today and sent here       Review of patient's allergies indicates:  No Known Allergies     HPI   HPI    8/27/2018, 8:53 PM   History obtained from the patient      History of Present Illness: Cl Saunders is a 37 y.o. male patient who presents to the Emergency Department for productive cough with yellow sputum which onset gradually a few days PTA. Pt reports he was diagnosed with Bronchitis at [facility] a few days ago and was prescribed steroids and an inhaler. He saw his PCP today and was referred to the ED. Symptoms are worsening and moderate in severity. No mitigating or exacerbating factors reported. No other associated sxs. Patient denies any fever, chills, CP, SOB, sore throat, rhinorrhea, and all other sxs at this time. No further complaints or concerns at this time.       Arrival mode: Personal vehicle      PCP: Primary Doctor No       Past Medical History:  Past Medical History:   Diagnosis Date    H/O ETOH abuse     Hypertension        Past Surgical History:  Past Surgical History:   Procedure Laterality Date    ADHD      TONSILLECTOMY           Family History:  Family History   Problem Relation Age of Onset    Hypertension Father     Hypertension Maternal Grandmother     Hypertension Maternal Grandfather        Social History:  Social History     Tobacco Use    Smoking status: Never Smoker    Smokeless tobacco: Current User     Types: Chew   Substance and Sexual Activity    Alcohol use: Yes     Alcohol/week: 8.4 oz     Types: 14 Glasses of wine per week    Drug use: Yes     Types: Marijuana     Comment: "sometimes marijuana"    Sexual activity: unknown       ROS   Review of Systems   Constitutional: Negative for chills and fever.   HENT: " Negative for congestion and sore throat.    Respiratory: Positive for cough (productive). Negative for shortness of breath.    Cardiovascular: Negative for chest pain and palpitations.   Gastrointestinal: Negative for nausea and vomiting.   Genitourinary: Negative for dysuria.   Musculoskeletal: Negative for back pain and myalgias.   Skin: Negative for rash.   Neurological: Negative for weakness and numbness.   Hematological: Does not bruise/bleed easily.   All other systems reviewed and are negative.    Physical Exam      Initial Vitals [08/27/18 1900]   BP Pulse Resp Temp SpO2   (!) 185/100 (!) 120 20 98.4 °F (36.9 °C) (!) 94 %      MAP       --          Physical Exam  Nursing Notes and Vital Signs Reviewed.  Constitutional: Patient is in no acute distress. Well-developed and well-nourished.  Head: Atraumatic. Normocephalic.  Eyes: PERRL. EOM intact. Conjunctivae are not pale. No scleral icterus.  ENT: Mucous membranes are moist. Oropharynx is clear and symmetric.    Neck: Supple. Full ROM. No lymphadenopathy.  Cardiovascular: Tachycardic. Regular rhythm. No murmurs, rubs, or gallops. Distal pulses are 2+ and symmetric.  Pulmonary/Chest: Mild tachypnea. Course breath sounds to the RLL.   Abdominal: Soft and non-distended.  There is no tenderness.  No rebound, guarding, or rigidity.  Musculoskeletal: Moves all extremities. No obvious deformities. No edema. No calf tenderness.  Skin: Warm and dry.  Neurological:  Alert, awake, and appropriate.  Normal speech.  No acute focal neurological deficits are appreciated.  Psychiatric: Normal affect. Good eye contact. Appropriate in content.    ED Course    Procedures  ED Vital Signs:  Vitals:    08/27/18 1900 08/27/18 2100 08/27/18 2109 08/27/18 2147   BP: (!) 185/100  (!) 148/99 (!) 148/99   Pulse: (!) 120 89 110 96   Resp: 20 18 18   Temp: 98.4 °F (36.9 °C)   98.9 °F (37.2 °C)   TempSrc: Oral   Oral   SpO2: (!) 94%  (!) 94% 95%   Weight: 120.4 kg (265 lb 5.2 oz)       Height: 6' (1.829 m)       08/27/18 2200 08/27/18 2207 08/27/18 2230 08/28/18 0000   BP: (!) 154/100   129/72   Pulse: 104 100 105 101   Resp: (!) 27 18 (!) 27 (!) 28   Temp:   98 °F (36.7 °C)    TempSrc:       SpO2: (!) 93% (!) 93% (!) 92% (!) 93%   Weight:       Height:        08/28/18 0100   BP: 112/68   Pulse: 79   Resp: (!) 23   Temp:    TempSrc:    SpO2: (!) 90%   Weight:    Height:        Abnormal Lab Results:  Labs Reviewed   CBC W/ AUTO DIFFERENTIAL - Abnormal; Notable for the following components:       Result Value    Hemoglobin 20.1 (*)     Hematocrit 54.1 (*)     MCH 34.7 (*)     MCHC 37.2 (*)     Gran # (ANC) 7.8 (*)     Gran% 80.7 (*)     Lymph% 12.9 (*)     All other components within normal limits    Narrative:     HGB critical result(s) called and verbal readback obtained from   Katarzyna Bryant RN ED, 08/27/2018 22:09   COMPREHENSIVE METABOLIC PANEL - Abnormal; Notable for the following components:    CO2 21 (*)     Total Protein 8.8 (*)     AST 72 (*)      (*)     All other components within normal limits   CBC W/ AUTO DIFFERENTIAL - Abnormal; Notable for the following components:    MCH 33.7 (*)     Platelets 148 (*)     Gran% 73.2 (*)     Lymph% 16.9 (*)     All other components within normal limits   CULTURE, BLOOD    Narrative:     Aerobic and anaerobic   CULTURE, BLOOD    Narrative:     Aerobic and anaerobic   LACTIC ACID, PLASMA   URINALYSIS, REFLEX TO URINE CULTURE    Narrative:     Preferred Collection Type->Urine, Clean Catch   TROPONIN I   PROCALCITONIN   INFLUENZA A AND B ANTIGEN   PROTIME-INR   APTT   B-TYPE NATRIURETIC PEPTIDE        All Lab Results:  Results for orders placed or performed during the hospital encounter of 08/27/18   Blood culture x two cultures. Draw prior to antibiotics.   Result Value Ref Range    Blood Culture, Routine No Growth to date    Blood culture x two cultures. Draw prior to antibiotics.   Result Value Ref Range    Blood Culture, Routine No Growth  to date    CBC auto differential   Result Value Ref Range    WBC 9.72 3.90 - 12.70 K/uL    RBC 5.80 4.60 - 6.20 M/uL    Hemoglobin 20.1 (HH) 14.0 - 18.0 g/dL    Hematocrit 54.1 (H) 40.0 - 54.0 %    MCV 93 82 - 98 fL    MCH 34.7 (H) 27.0 - 31.0 pg    MCHC 37.2 (H) 32.0 - 36.0 g/dL    RDW 13.4 11.5 - 14.5 %    Platelets 200 150 - 350 K/uL    MPV 9.9 9.2 - 12.9 fL    Gran # (ANC) 7.8 (H) 1.8 - 7.7 K/uL    Lymph # 1.3 1.0 - 4.8 K/uL    Mono # 0.7 0.3 - 1.0 K/uL    Eos # 0.0 0.0 - 0.5 K/uL    Baso # 0.01 0.00 - 0.20 K/uL    Gran% 80.7 (H) 38.0 - 73.0 %    Lymph% 12.9 (L) 18.0 - 48.0 %    Mono% 6.8 4.0 - 15.0 %    Eosinophil% 0.1 0.0 - 8.0 %    Basophil% 0.1 0.0 - 1.9 %    Platelet Estimate Appears normal     Aniso Slight     Poik Slight     Poly Occasional     Ovalocytes Occasional     Tear Drop Cells Moderate     Stomatocytes Present     Differential Method Automated    Comprehensive metabolic panel   Result Value Ref Range    Sodium 138 136 - 145 mmol/L    Potassium 4.0 3.5 - 5.1 mmol/L    Chloride 102 95 - 110 mmol/L    CO2 21 (L) 23 - 29 mmol/L    Glucose 103 70 - 110 mg/dL    BUN, Bld 15 6 - 20 mg/dL    Creatinine 0.9 0.5 - 1.4 mg/dL    Calcium 10.2 8.7 - 10.5 mg/dL    Total Protein 8.8 (H) 6.0 - 8.4 g/dL    Albumin 4.7 3.5 - 5.2 g/dL    Total Bilirubin 0.8 0.1 - 1.0 mg/dL    Alkaline Phosphatase 81 55 - 135 U/L    AST 72 (H) 10 - 40 U/L     (H) 10 - 44 U/L    Anion Gap 15 8 - 16 mmol/L    eGFR if African American >60 >60 mL/min/1.73 m^2    eGFR if non African American >60 >60 mL/min/1.73 m^2   Lactic acid, plasma #1   Result Value Ref Range    Lactate (Lactic Acid) 1.5 0.5 - 2.2 mmol/L   Urinalysis, Reflex to Urine Culture Urine, Clean Catch   Result Value Ref Range    Specimen UA Urine, Clean Catch     Color, UA Yellow Yellow, Straw, Graciela    Appearance, UA Clear Clear    pH, UA 6.0 5.0 - 8.0    Specific Gravity, UA 1.025 1.005 - 1.030    Protein, UA Negative Negative    Glucose, UA Negative Negative     Ketones, UA Negative Negative    Bilirubin (UA) Negative Negative    Occult Blood UA Negative Negative    Nitrite, UA Negative Negative    Urobilinogen, UA Negative <2.0 EU/dL    Leukocytes, UA Negative Negative   Troponin I   Result Value Ref Range    Troponin I 0.018 0.000 - 0.026 ng/mL   Procalcitonin   Result Value Ref Range    Procalcitonin 0.06 <0.25 ng/mL   Influenza antigen Nasopharyngeal Swab   Result Value Ref Range    Influenza A Ag, EIA Negative Negative    Influenza B Ag, EIA Negative Negative    Flu A & B Source Nasopharyngeal Swab    Protime-INR   Result Value Ref Range    Prothrombin Time 10.1 9.0 - 12.5 sec    INR 1.0 0.8 - 1.2   APTT   Result Value Ref Range    aPTT 27.5 21.0 - 32.0 sec   Brain natriuretic peptide   Result Value Ref Range    BNP <10 0 - 99 pg/mL   CBC auto differential   Result Value Ref Range    WBC 6.62 3.90 - 12.70 K/uL    RBC 4.93 4.60 - 6.20 M/uL    Hemoglobin 16.6 14.0 - 18.0 g/dL    Hematocrit 46.6 40.0 - 54.0 %    MCV 95 82 - 98 fL    MCH 33.7 (H) 27.0 - 31.0 pg    MCHC 35.6 32.0 - 36.0 g/dL    RDW 13.5 11.5 - 14.5 %    Platelets 148 (L) 150 - 350 K/uL    MPV 9.7 9.2 - 12.9 fL    Gran # (ANC) 4.9 1.8 - 7.7 K/uL    Lymph # 1.1 1.0 - 4.8 K/uL    Mono # 0.6 0.3 - 1.0 K/uL    Eos # 0.0 0.0 - 0.5 K/uL    Baso # 0.01 0.00 - 0.20 K/uL    Gran% 73.2 (H) 38.0 - 73.0 %    Lymph% 16.9 (L) 18.0 - 48.0 %    Mono% 9.5 4.0 - 15.0 %    Eosinophil% 0.2 0.0 - 8.0 %    Basophil% 0.2 0.0 - 1.9 %    Differential Method Automated          Imaging Results:  Imaging Results          CTA Chest Non-Coronary (PE Study) (Final result)  Result time 08/28/18 00:23:01    Final result by SARAH Loo Sr., MD (08/28/18 00:23:01)                 Impression:      1. There is no pulmonary embolus.  2. There is no evidence of an acute pulmonary process.  3. There are calcified right perihilar lymph nodes. There is a 6 mm calcified granuloma in the right lower lobe. There are punctate calcifications  scattered throughout the spleen.  This is characteristic of a prior granulomatous infection.  All CT scans at this facility use dose modulation, iterative reconstruction, and/or weight base dosing when appropriate to reduce radiation dose when appropriate to reduce radiation dose to as low as reasonably achievable.      Electronically signed by: Magdi Loo MD  Date:    08/28/2018  Time:    00:23             Narrative:    EXAMINATION:  CTA CHEST NON CORONARY    CLINICAL HISTORY:  Chest pain, acute, PE suspected, high pretest prob;    TECHNIQUE:  Standard chest CT protocol was performed with IV contrast and 3D MIP reformats.  100 mL of Omnipaque 350 contrast material was used for this examination.    COMPARISON:  A chest x-ray performed on 08/27/2018.    FINDINGS:  This is a limited examination secondary to lack of inclusion of the entire thorax on the images.  The size of heart is within normal limits. The thoracic aorta is normal in appearance. There is no pulmonary embolism.  There are calcified right perihilar lymph nodes.  There is a 6 mm calcified granuloma in the right lower lobe.  There is no evidence of an acute pulmonary process.  There is no pneumothorax or pleural effusion.  There are punctate calcifications scattered throughout the spleen.                               X-Ray Chest AP Portable (Final result)  Result time 08/27/18 23:32:12    Final result by SARAH Loo Sr., MD (08/27/18 23:32:12)                 Impression:      Normal study.      Electronically signed by: Magdi Loo MD  Date:    08/27/2018  Time:    23:32             Narrative:    EXAMINATION:  XR CHEST AP PORTABLE    CLINICAL HISTORY:  Sepsis;    COMPARISON:  06/14/2016    FINDINGS:  The size of the heart is normal. The lungs are clear. There is no pneumothorax.  The costophrenic angles are sharp.                               The EKG was ordered, reviewed, and independently interpreted by the ED provider.  Interpretation  time: 2111  Rate: 106 BPM  Rhythm: sinus tachycardia  Interpretation: R superior axis deviation. Septal infarct. No STEMI.         The Emergency Provider reviewed the vital signs and test results, which are outlined above.    ED Discussion     12:39 AM: Reassessed pt at this time.  Pt states his condition has improved at this time. Pt's Hgb WNL after 3L IVF. Will prescribe pt abx for his bronchitis based on severity of sxs and clinical discretion. Discussed with pt all pertinent ED information and results. Discussed pt dx and plan of tx. Gave pt all f/u and return to the ED instructions. All questions and concerns were addressed at this time. Pt expresses understanding of information and instructions, and is comfortable with plan to discharge. Pt is stable for discharge.    Patient presents with upper respiratory symptoms. Based on my assessment in the ED, I do not suspect any respiratory, airway, pulmonary, cardiovascular (including myocarditis), metabolic, CNS, medical, or surgical emergency medical condition. I have discussed with the patient and/or caregiver signs and symptoms for secondary bacterial infections, such as pneumonia. Patient is safe for discharge home with conservative therapy.    ED Medication(s):  Medications   sodium chloride 0.9% bolus 3,612 mL (0 mL/kg × 120.4 kg Intravenous Stopped 8/27/18 2313)   cefTRIAXone (ROCEPHIN) 1 g in dextrose 5 % 50 mL IVPB (0 g Intravenous Stopped 8/27/18 2212)   azithromycin 500 mg in dextrose 5 % 250 mL IVPB (ready to mix system) (0 mg Intravenous Stopped 8/27/18 2327)   albuterol-ipratropium 2.5 mg-0.5 mg/3 mL nebulizer solution 3 mL (3 mLs Nebulization Given 8/27/18 2207)   acetaminophen tablet 1,000 mg (1,000 mg Oral Given 8/27/18 2250)   omnipaque 350 iohexol 100 mL (100 mLs Intravenous Given 8/27/18 2355)       Discharge Medication List as of 8/28/2018 12:41 AM      START taking these medications    Details   albuterol 90 mcg/actuation inhaler Inhale 2 puffs  into the lungs every 6 (six) hours as needed for Wheezing., Starting Tue 8/28/2018, Until Wed 8/28/2019, Print      levoFLOXacin (LEVAQUIN) 500 MG tablet Take 1 tablet (500 mg total) by mouth once daily. for 10 days, Starting Tue 8/28/2018, Until Fri 9/7/2018, Print             Follow-up Information     PCP. Call in 1 day.           [facility] - .    Specialty:  Emergency Medicine  Why:  If symptoms worsen  Contact information:  [address]  [facility] [address] [phone]                   Medical Decision Making    Medical Decision Making:   Clinical Tests:   Lab Tests: Ordered and Reviewed  Radiological Study: Reviewed and Ordered  Medical Tests: Reviewed and Ordered           Scribe Attestation:   Scribe #1: I performed the above scribed service and the documentation accurately describes the services I performed. I attest to the accuracy of the note.    Attending:   Physician Attestation Statement for Scribe #1: I, Patel August MD, personally performed the services described in this documentation, as scribed by Jennyfer Coe, in my presence, and it is both accurate and complete.          Clinical Impression       ICD-10-CM ICD-9-CM   1. Bronchitis J40 490   2. Cough R05 786.2   3. Dehydration E86.0 276.51       Disposition:   Disposition: Discharged  Condition: Stable         Patel August MD  08/28/18 2009

## 2018-09-02 LAB
BACTERIA BLD CULT: NORMAL
BACTERIA BLD CULT: NORMAL

## 2018-12-11 NOTE — PROGRESS NOTES
CC: 36 y/o male right ankle pain    Date of Injury: 11/29/2017    HPI: On the date above, the patient rolled his ankle while stepping on uneven pavement.  A pop in his ankle.  He reported to the emergency room.  He continues to have a dull stabbing aching pain in the ankle.  Pain is 8 on a 10 scale.  He has tried NSAIDs with minimal relief.  Pain decreases at rest, pain increases with any activity.  He does work standing on it for 10-14 hours per day.    PMH:    Past Medical History:   Diagnosis Date    H/O ETOH abuse     Hypertension        PSH:    Past Surgical History:   Procedure Laterality Date    TONSILLECTOMY         Family Hx:    Family History   Problem Relation Age of Onset    Hypertension Father     Hypertension Maternal Grandmother     Hypertension Maternal Grandfather        Allergy:  Review of patient's allergies indicates:  No Known Allergies    Medication:    Current Outpatient Prescriptions:     dextroamphetamine-amphetamine (ADDERALL) 30 mg Tab, Take by mouth 2 (two) times daily., Disp: , Rfl:     LOSARTAN POTASSIUM (LOSARTAN ORAL), Take by mouth once daily. , Disp: , Rfl:     nabumetone (RELAFEN) 750 MG tablet, Take 1 tablet (750 mg total) by mouth 2 (two) times daily as needed for Pain., Disp: 60 tablet, Rfl: 0    ondansetron (ZOFRAN-ODT) 4 MG TbDL, Take 1 tablet (4 mg total) by mouth every 6 (six) hours as needed., Disp: 15 tablet, Rfl: 0    zolpidem (AMBIEN) 10 mg Tab, Take 10 mg by mouth nightly as needed., Disp: , Rfl: 2    amlodipine (NORVASC) 5 MG tablet, Take 1 tablet (5 mg total) by mouth once daily., Disp: 30 tablet, Rfl: 1    diclofenac (VOLTAREN) 50 MG EC tablet, Take 1 tablet (50 mg total) by mouth 3 (three) times daily as needed., Disp: 20 tablet, Rfl: 0    Social History:    Social History     Social History    Marital status:      Spouse name: N/A    Number of children: N/A    Years of education: N/A     Occupational History    [employer]      Social History  Dr Fred Cisneros added order on Saturday 12/08/18  "Main Topics    Smoking status: Never Smoker    Smokeless tobacco: Current User     Types: Chew    Alcohol use 8.4 oz/week     14 Glasses of wine per week    Drug use: Yes     Types: Marijuana      Comment: "sometimes marijuana"    Sexual activity: Not on file     Other Topics Concern    Not on file     Social History Narrative    Lives with spouse and children           Vitals:   BP (!) 144/88   Pulse 67   Resp 18   Ht 6' (1.829 m)   Wt 113.4 kg (250 lb)   BMI 33.91 kg/m²      ROS:  GENERAL: No fever, chills, fatigability or weight loss.  SKIN: No rashes, itching or changes in color or texture of skin.  HEAD: No headaches or recent head trauma.  EYES: Visual acuity fine. No photophobia, ocular pain or diplopia.  EARS: Denies ear pain, discharge or vertigo.  NOSE: No loss of smell, no epistaxis or postnasal drip.  MOUTH & THROAT: No hoarseness or change in voice. No excessive gum bleeding.  NODES: Denies swollen glands.  CHEST: Denies RIOS, cyanosis, wheezing, cough and sputum production.  CARDIOVASCULAR: Denies chest pain, PND, orthopnea or reduced exercise tolerance.  ABDOMEN: Appetite fine. No weight loss. Denies diarrhea, abdominal pain, hematemesis or blood in stool.  URINARY: No flank pain, dysuria or hematuria.  PERIPHERAL VASCULAR: No claudication or cyanosis.  NEUROLOGIC: No history of seizures, paralysis, alteration of gait or coordination.  MUSCULOSKELETAL: See HPI    PE:  APPEARANCE: Well nourished, well developed, in no acute distress.   HEAD: Normocephalic, atraumatic.  NEUROLOGIC: Cranial Nerves: II-XII grossly intact, also see   MUSCULOSKELETAL: .            Right ankle- 2 plus dorsalis pedis and posterior tibial artery pulses, light touch intact Right lower extremity.  All digits are warm. No erythema, no warmth, no drainage, no swelling,   Tenderness over the deltoid ligament as well as the peroneal tendon,   Less than 2 seconds capillary refill all digits.         Assessment:           " Diagnosis:              1.Right ankle deltoid ligament sprain               2.  Peroneal tendon strain                Diagnostic Studies  MRI-Yes agree with the findings of There is an ankle joint effusion present. No fracture or dislocation or contusion.. From normal fat is seen in the sinus tarsi. Achilles tendon is intact. No evidence for plantar fasciitis. The visualized extensor and flexor tendons appear intact. Anterior and posterior talofibular ligaments appear intact. There is edema within the subcutaneous soft tissues about the ankle.    X-Ray-Yes agree with Findings: There is no radiographic evidence of acute osseous, articular, or soft tissue abnormality. Joint spaces are well preserved      Plan:                                                 1. PT-no                                                 2.OT-no                                          3.NSAID-yes, Relafen 750 mg twice a day as needed                                      4. Narcotics-no                                     5. Wound care-N/A                                 6. Rest-no                                           7. Surgery-no                                         8. JACQUELINE Hose-no                                    9. Anticoagulation therapy-no               10. Elevation-no                                     11. Crutches-yes, crutch assisted ambulation nonweight bear right lower extremity.                                    12. Walker-no             13. Cane no                        14. Referral-no                                     15.Injection-no                            16.  Cam Walker            17. RICE            18. Follow up-  4 weeks for recheck

## 2022-05-29 ENCOUNTER — HOSPITAL ENCOUNTER (INPATIENT)
Facility: HOSPITAL | Age: 42
LOS: 2 days | Discharge: HOME | End: 2022-05-31
Attending: EMERGENCY MEDICINE | Admitting: EMERGENCY MEDICINE
Payer: MEDICAID

## 2022-05-29 DIAGNOSIS — T50.901A OVERDOSE: ICD-10-CM

## 2022-05-29 DIAGNOSIS — N17.9 AKI (ACUTE KIDNEY INJURY): ICD-10-CM

## 2022-05-29 DIAGNOSIS — F10.929 ALCOHOLIC INTOXICATION WITH COMPLICATION: ICD-10-CM

## 2022-05-29 DIAGNOSIS — J96.01 ACUTE RESPIRATORY FAILURE WITH HYPOXIA: Primary | ICD-10-CM

## 2022-05-29 DIAGNOSIS — T17.800A ASPIRATION INTO LOWER RESPIRATORY TRACT, INITIAL ENCOUNTER: ICD-10-CM

## 2022-05-29 DIAGNOSIS — R74.8 ELEVATED LIVER ENZYMES: ICD-10-CM

## 2022-05-29 DIAGNOSIS — E87.20 LACTIC ACIDOSIS: ICD-10-CM

## 2022-05-29 DIAGNOSIS — I95.9 HYPOTENSION, UNSPECIFIED HYPOTENSION TYPE: ICD-10-CM

## 2022-05-29 PROBLEM — A41.9 SEPTIC SHOCK: Status: ACTIVE | Noted: 2022-05-29

## 2022-05-29 PROBLEM — J69.0 ASPIRATION PNEUMONIA: Status: ACTIVE | Noted: 2022-05-29

## 2022-05-29 PROBLEM — E66.01 MORBID OBESITY WITH BMI OF 40.0-44.9, ADULT: Status: ACTIVE | Noted: 2022-05-29

## 2022-05-29 PROBLEM — R65.21 SEPTIC SHOCK: Status: ACTIVE | Noted: 2022-05-29

## 2022-05-29 PROBLEM — J96.02 ACUTE HYPERCAPNIC RESPIRATORY FAILURE: Status: ACTIVE | Noted: 2022-05-29

## 2022-05-29 LAB
ALBUMIN SERPL BCP-MCNC: 3.7 G/DL (ref 3.5–5.2)
ALLENS TEST: ABNORMAL
ALP SERPL-CCNC: 73 U/L (ref 55–135)
ALT SERPL W/O P-5'-P-CCNC: 72 U/L (ref 10–44)
AMPHET+METHAMPHET UR QL: NEGATIVE
ANION GAP SERPL CALC-SCNC: 18 MMOL/L (ref 8–16)
APAP SERPL-MCNC: <3 UG/ML (ref 10–20)
AST SERPL-CCNC: 69 U/L (ref 10–40)
BACTERIA #/AREA URNS HPF: NORMAL /HPF
BARBITURATES UR QL SCN>200 NG/ML: NEGATIVE
BASOPHILS # BLD AUTO: 0.05 K/UL (ref 0–0.2)
BASOPHILS NFR BLD: 0.4 % (ref 0–1.9)
BENZODIAZ UR QL SCN>200 NG/ML: NEGATIVE
BILIRUB SERPL-MCNC: 0.3 MG/DL (ref 0.1–1)
BILIRUB UR QL STRIP: NEGATIVE
BUN SERPL-MCNC: 13 MG/DL (ref 6–20)
BZE UR QL SCN: NEGATIVE
CALCIUM SERPL-MCNC: 8.4 MG/DL (ref 8.7–10.5)
CANNABINOIDS UR QL SCN: ABNORMAL
CHLORIDE SERPL-SCNC: 106 MMOL/L (ref 95–110)
CLARITY UR: CLEAR
CO2 SERPL-SCNC: 22 MMOL/L (ref 23–29)
COLOR UR: YELLOW
CREAT SERPL-MCNC: 1.5 MG/DL (ref 0.5–1.4)
CREAT UR-MCNC: 118.9 MG/DL (ref 23–375)
CTP QC/QA: YES
DELSYS: ABNORMAL
DIFFERENTIAL METHOD: ABNORMAL
EOSINOPHIL # BLD AUTO: 0 K/UL (ref 0–0.5)
EOSINOPHIL NFR BLD: 0.2 % (ref 0–8)
ERYTHROCYTE [DISTWIDTH] IN BLOOD BY AUTOMATED COUNT: 13.1 % (ref 11.5–14.5)
ERYTHROCYTE [SEDIMENTATION RATE] IN BLOOD BY WESTERGREN METHOD: 24 MM/H
EST. GFR  (AFRICAN AMERICAN): >60 ML/MIN/1.73 M^2
EST. GFR  (NON AFRICAN AMERICAN): 57 ML/MIN/1.73 M^2
ETHANOL SERPL-MCNC: 182 MG/DL
FIO2: 100
GLUCOSE SERPL-MCNC: 135 MG/DL (ref 70–110)
GLUCOSE UR QL STRIP: NEGATIVE
HCO3 UR-SCNC: 26.5 MMOL/L (ref 24–28)
HCT VFR BLD AUTO: 45.2 % (ref 40–54)
HGB BLD-MCNC: 15.4 G/DL (ref 14–18)
HGB UR QL STRIP: ABNORMAL
HYALINE CASTS #/AREA URNS LPF: 1 /LPF
IMM GRANULOCYTES # BLD AUTO: 0.16 K/UL (ref 0–0.04)
IMM GRANULOCYTES NFR BLD AUTO: 1.2 % (ref 0–0.5)
KETONES UR QL STRIP: NEGATIVE
LACTATE SERPL-SCNC: 2.1 MMOL/L (ref 0.5–2.2)
LACTATE SERPL-SCNC: 5.6 MMOL/L (ref 0.5–2.2)
LEUKOCYTE ESTERASE UR QL STRIP: NEGATIVE
LYMPHOCYTES # BLD AUTO: 3.2 K/UL (ref 1–4.8)
LYMPHOCYTES NFR BLD: 23.3 % (ref 18–48)
MCH RBC QN AUTO: 32.3 PG (ref 27–31)
MCHC RBC AUTO-ENTMCNC: 34.1 G/DL (ref 32–36)
MCV RBC AUTO: 95 FL (ref 82–98)
METHADONE UR QL SCN>300 NG/ML: NEGATIVE
MICROSCOPIC COMMENT: NORMAL
MODE: ABNORMAL
MONOCYTES # BLD AUTO: 0.9 K/UL (ref 0.3–1)
MONOCYTES NFR BLD: 6.8 % (ref 4–15)
NEUTROPHILS # BLD AUTO: 9.4 K/UL (ref 1.8–7.7)
NEUTROPHILS NFR BLD: 68.1 % (ref 38–73)
NITRITE UR QL STRIP: NEGATIVE
NRBC BLD-RTO: 0 /100 WBC
OPIATES UR QL SCN: NEGATIVE
PCO2 BLDA: 50.9 MMHG (ref 35–45)
PCP UR QL SCN>25 NG/ML: NEGATIVE
PEEP: 5
PH SMN: 7.32 [PH] (ref 7.35–7.45)
PH UR STRIP: 6 [PH] (ref 5–8)
PLATELET # BLD AUTO: 171 K/UL (ref 150–450)
PMV BLD AUTO: 9.4 FL (ref 9.2–12.9)
PO2 BLDA: 162 MMHG (ref 80–100)
POC BE: 0 MMOL/L
POC SATURATED O2: 99 % (ref 95–100)
POCT GLUCOSE: 106 MG/DL (ref 70–110)
POTASSIUM SERPL-SCNC: 4.2 MMOL/L (ref 3.5–5.1)
PROT SERPL-MCNC: 6.7 G/DL (ref 6–8.4)
PROT UR QL STRIP: ABNORMAL
RBC # BLD AUTO: 4.77 M/UL (ref 4.6–6.2)
RBC #/AREA URNS HPF: 1 /HPF (ref 0–4)
SALICYLATES SERPL-MCNC: <5 MG/DL (ref 15–30)
SAMPLE: ABNORMAL
SARS-COV-2 RDRP RESP QL NAA+PROBE: NEGATIVE
SITE: ABNORMAL
SODIUM SERPL-SCNC: 146 MMOL/L (ref 136–145)
SP GR UR STRIP: 1.02 (ref 1–1.03)
SQUAMOUS #/AREA URNS HPF: 1 /HPF
TOXICOLOGY INFORMATION: ABNORMAL
URN SPEC COLLECT METH UR: ABNORMAL
UROBILINOGEN UR STRIP-ACNC: NEGATIVE EU/DL
VT: 460
WBC # BLD AUTO: 13.79 K/UL (ref 3.9–12.7)
WBC #/AREA URNS HPF: 1 /HPF (ref 0–5)

## 2022-05-29 PROCEDURE — 99900035 HC TECH TIME PER 15 MIN (STAT)

## 2022-05-29 PROCEDURE — 80179 DRUG ASSAY SALICYLATE: CPT | Performed by: EMERGENCY MEDICINE

## 2022-05-29 PROCEDURE — 99900026 HC AIRWAY MAINTENANCE (STAT)

## 2022-05-29 PROCEDURE — 93010 ELECTROCARDIOGRAM REPORT: CPT | Mod: ,,, | Performed by: INTERNAL MEDICINE

## 2022-05-29 PROCEDURE — 80307 DRUG TEST PRSMV CHEM ANLYZR: CPT | Performed by: EMERGENCY MEDICINE

## 2022-05-29 PROCEDURE — 82962 GLUCOSE BLOOD TEST: CPT

## 2022-05-29 PROCEDURE — 96361 HYDRATE IV INFUSION ADD-ON: CPT

## 2022-05-29 PROCEDURE — 84484 ASSAY OF TROPONIN QUANT: CPT | Performed by: INTERNAL MEDICINE

## 2022-05-29 PROCEDURE — 82077 ASSAY SPEC XCP UR&BREATH IA: CPT | Performed by: EMERGENCY MEDICINE

## 2022-05-29 PROCEDURE — 99291 CRITICAL CARE FIRST HOUR: CPT | Mod: ,,, | Performed by: INTERNAL MEDICINE

## 2022-05-29 PROCEDURE — 94002 VENT MGMT INPAT INIT DAY: CPT

## 2022-05-29 PROCEDURE — 25000003 PHARM REV CODE 250: Performed by: INTERNAL MEDICINE

## 2022-05-29 PROCEDURE — 93005 ELECTROCARDIOGRAM TRACING: CPT

## 2022-05-29 PROCEDURE — 93010 EKG 12-LEAD: ICD-10-PCS | Mod: ,,, | Performed by: INTERNAL MEDICINE

## 2022-05-29 PROCEDURE — U0002 COVID-19 LAB TEST NON-CDC: HCPCS | Performed by: EMERGENCY MEDICINE

## 2022-05-29 PROCEDURE — 96375 TX/PRO/DX INJ NEW DRUG ADDON: CPT

## 2022-05-29 PROCEDURE — 36415 COLL VENOUS BLD VENIPUNCTURE: CPT | Performed by: INTERNAL MEDICINE

## 2022-05-29 PROCEDURE — 36600 WITHDRAWAL OF ARTERIAL BLOOD: CPT

## 2022-05-29 PROCEDURE — 82803 BLOOD GASES ANY COMBINATION: CPT

## 2022-05-29 PROCEDURE — 96374 THER/PROPH/DIAG INJ IV PUSH: CPT | Mod: 59

## 2022-05-29 PROCEDURE — 63600175 PHARM REV CODE 636 W HCPCS: Performed by: INTERNAL MEDICINE

## 2022-05-29 PROCEDURE — 99291 CRITICAL CARE FIRST HOUR: CPT | Mod: 25

## 2022-05-29 PROCEDURE — 63600175 PHARM REV CODE 636 W HCPCS: Performed by: EMERGENCY MEDICINE

## 2022-05-29 PROCEDURE — 87040 BLOOD CULTURE FOR BACTERIA: CPT | Mod: 59 | Performed by: EMERGENCY MEDICINE

## 2022-05-29 PROCEDURE — 99291 PR CRITICAL CARE, E/M 30-74 MINUTES: ICD-10-PCS | Mod: ,,, | Performed by: INTERNAL MEDICINE

## 2022-05-29 PROCEDURE — 25000003 PHARM REV CODE 250: Performed by: EMERGENCY MEDICINE

## 2022-05-29 PROCEDURE — 87205 SMEAR GRAM STAIN: CPT | Performed by: INTERNAL MEDICINE

## 2022-05-29 PROCEDURE — 20000000 HC ICU ROOM

## 2022-05-29 PROCEDURE — 87070 CULTURE OTHR SPECIMN AEROBIC: CPT | Performed by: INTERNAL MEDICINE

## 2022-05-29 PROCEDURE — 25000003 PHARM REV CODE 250

## 2022-05-29 PROCEDURE — 80143 DRUG ASSAY ACETAMINOPHEN: CPT | Performed by: EMERGENCY MEDICINE

## 2022-05-29 PROCEDURE — 80053 COMPREHEN METABOLIC PANEL: CPT | Performed by: EMERGENCY MEDICINE

## 2022-05-29 PROCEDURE — 99292 CRITICAL CARE ADDL 30 MIN: CPT

## 2022-05-29 PROCEDURE — 31500 INSERT EMERGENCY AIRWAY: CPT

## 2022-05-29 PROCEDURE — 63600175 PHARM REV CODE 636 W HCPCS

## 2022-05-29 PROCEDURE — 81000 URINALYSIS NONAUTO W/SCOPE: CPT | Mod: 59 | Performed by: EMERGENCY MEDICINE

## 2022-05-29 PROCEDURE — 83605 ASSAY OF LACTIC ACID: CPT | Mod: 91 | Performed by: INTERNAL MEDICINE

## 2022-05-29 PROCEDURE — 83605 ASSAY OF LACTIC ACID: CPT | Performed by: EMERGENCY MEDICINE

## 2022-05-29 PROCEDURE — 85025 COMPLETE CBC W/AUTO DIFF WBC: CPT | Performed by: EMERGENCY MEDICINE

## 2022-05-29 RX ORDER — FENTANYL CITRATE 50 UG/ML
INJECTION, SOLUTION INTRAMUSCULAR; INTRAVENOUS CODE/TRAUMA/SEDATION MEDICATION
Status: COMPLETED | OUTPATIENT
Start: 2022-05-29 | End: 2022-05-29

## 2022-05-29 RX ORDER — SUCCINYLCHOLINE CHLORIDE 20 MG/ML
INJECTION INTRAMUSCULAR; INTRAVENOUS CODE/TRAUMA/SEDATION MEDICATION
Status: COMPLETED | OUTPATIENT
Start: 2022-05-29 | End: 2022-05-29

## 2022-05-29 RX ORDER — NOREPINEPHRINE BITARTRATE/D5W 4MG/250ML
PLASTIC BAG, INJECTION (ML) INTRAVENOUS
Status: COMPLETED
Start: 2022-05-29 | End: 2022-05-29

## 2022-05-29 RX ORDER — PROPOFOL 10 MG/ML
INJECTION, EMULSION INTRAVENOUS
Status: DISPENSED
Start: 2022-05-29 | End: 2022-05-30

## 2022-05-29 RX ORDER — CHLORHEXIDINE GLUCONATE ORAL RINSE 1.2 MG/ML
15 SOLUTION DENTAL 2 TIMES DAILY
Status: DISCONTINUED | OUTPATIENT
Start: 2022-05-29 | End: 2022-05-30

## 2022-05-29 RX ORDER — CEFEPIME HYDROCHLORIDE 1 G/50ML
2 INJECTION, SOLUTION INTRAVENOUS
Status: COMPLETED | OUTPATIENT
Start: 2022-05-29 | End: 2022-05-29

## 2022-05-29 RX ORDER — NALOXONE HCL 0.4 MG/ML
VIAL (ML) INJECTION
Status: DISPENSED
Start: 2022-05-29 | End: 2022-05-30

## 2022-05-29 RX ORDER — FAMOTIDINE 10 MG/ML
20 INJECTION INTRAVENOUS 2 TIMES DAILY
Status: DISCONTINUED | OUTPATIENT
Start: 2022-05-29 | End: 2022-05-30

## 2022-05-29 RX ORDER — PROPOFOL 10 MG/ML
0-50 INJECTION, EMULSION INTRAVENOUS CONTINUOUS
Status: DISCONTINUED | OUTPATIENT
Start: 2022-05-29 | End: 2022-05-30

## 2022-05-29 RX ORDER — ENOXAPARIN SODIUM 100 MG/ML
40 INJECTION SUBCUTANEOUS EVERY 12 HOURS
Status: DISCONTINUED | OUTPATIENT
Start: 2022-05-29 | End: 2022-05-31 | Stop reason: HOSPADM

## 2022-05-29 RX ORDER — MIDAZOLAM HYDROCHLORIDE 5 MG/ML
2 INJECTION INTRAMUSCULAR; INTRAVENOUS
Status: DISCONTINUED | OUTPATIENT
Start: 2022-05-29 | End: 2022-05-29

## 2022-05-29 RX ORDER — FENTANYL CITRATE 50 UG/ML
INJECTION, SOLUTION INTRAMUSCULAR; INTRAVENOUS
Status: COMPLETED
Start: 2022-05-29 | End: 2022-05-29

## 2022-05-29 RX ORDER — PROPOFOL 10 MG/ML
VIAL (ML) INTRAVENOUS
Status: COMPLETED | OUTPATIENT
Start: 2022-05-29 | End: 2022-05-29

## 2022-05-29 RX ORDER — SODIUM CHLORIDE 9 MG/ML
INJECTION, SOLUTION INTRAVENOUS CONTINUOUS
Status: ACTIVE | OUTPATIENT
Start: 2022-05-29 | End: 2022-05-30

## 2022-05-29 RX ORDER — FENTANYL CITRATE-0.9 % NACL/PF 10 MCG/ML
0-250 PLASTIC BAG, INJECTION (ML) INTRAVENOUS CONTINUOUS
Status: DISCONTINUED | OUTPATIENT
Start: 2022-05-29 | End: 2022-05-30

## 2022-05-29 RX ORDER — IPRATROPIUM BROMIDE AND ALBUTEROL SULFATE 2.5; .5 MG/3ML; MG/3ML
3 SOLUTION RESPIRATORY (INHALATION) EVERY 4 HOURS PRN
Status: DISCONTINUED | OUTPATIENT
Start: 2022-05-29 | End: 2022-05-31 | Stop reason: HOSPADM

## 2022-05-29 RX ORDER — NALOXONE HCL 0.4 MG/ML
VIAL (ML) INJECTION CODE/TRAUMA/SEDATION MEDICATION
Status: COMPLETED | OUTPATIENT
Start: 2022-05-29 | End: 2022-05-29

## 2022-05-29 RX ORDER — ETOMIDATE 2 MG/ML
INJECTION INTRAVENOUS
Status: COMPLETED
Start: 2022-05-29 | End: 2022-05-29

## 2022-05-29 RX ORDER — NOREPINEPHRINE BITARTRATE/D5W 4MG/250ML
0-3 PLASTIC BAG, INJECTION (ML) INTRAVENOUS CONTINUOUS
Status: DISCONTINUED | OUTPATIENT
Start: 2022-05-29 | End: 2022-05-30

## 2022-05-29 RX ORDER — ONDANSETRON 2 MG/ML
4 INJECTION INTRAMUSCULAR; INTRAVENOUS EVERY 8 HOURS PRN
Status: DISCONTINUED | OUTPATIENT
Start: 2022-05-29 | End: 2022-05-31 | Stop reason: HOSPADM

## 2022-05-29 RX ORDER — FENTANYL CITRATE 50 UG/ML
INJECTION, SOLUTION INTRAMUSCULAR; INTRAVENOUS
Status: DISPENSED
Start: 2022-05-29 | End: 2022-05-30

## 2022-05-29 RX ORDER — SUCCINYLCHOLINE CHLORIDE 20 MG/ML
INJECTION INTRAMUSCULAR; INTRAVENOUS
Status: DISPENSED
Start: 2022-05-29 | End: 2022-05-30

## 2022-05-29 RX ORDER — CEFEPIME HYDROCHLORIDE 1 G/50ML
2 INJECTION, SOLUTION INTRAVENOUS
Status: DISCONTINUED | OUTPATIENT
Start: 2022-05-30 | End: 2022-05-29

## 2022-05-29 RX ORDER — ONDANSETRON 2 MG/ML
INJECTION INTRAMUSCULAR; INTRAVENOUS
Status: COMPLETED
Start: 2022-05-29 | End: 2022-05-29

## 2022-05-29 RX ORDER — MIDAZOLAM HYDROCHLORIDE 1 MG/ML
INJECTION INTRAMUSCULAR; INTRAVENOUS
Status: COMPLETED
Start: 2022-05-29 | End: 2022-05-29

## 2022-05-29 RX ORDER — LORAZEPAM 2 MG/ML
4 INJECTION INTRAMUSCULAR EVERY 4 HOURS PRN
Status: DISCONTINUED | OUTPATIENT
Start: 2022-05-29 | End: 2022-05-31 | Stop reason: HOSPADM

## 2022-05-29 RX ORDER — ACETAMINOPHEN 325 MG/1
650 TABLET ORAL EVERY 6 HOURS PRN
Status: DISCONTINUED | OUTPATIENT
Start: 2022-05-29 | End: 2022-05-31 | Stop reason: HOSPADM

## 2022-05-29 RX ADMIN — FENTANYL CITRATE 100 MCG: 50 INJECTION, SOLUTION INTRAMUSCULAR; INTRAVENOUS at 06:05

## 2022-05-29 RX ADMIN — SODIUM CHLORIDE, SODIUM LACTATE, POTASSIUM CHLORIDE, AND CALCIUM CHLORIDE 1000 ML: .6; .31; .03; .02 INJECTION, SOLUTION INTRAVENOUS at 06:05

## 2022-05-29 RX ADMIN — PROPOFOL 50 MCG/KG/MIN: 10 INJECTION, EMULSION INTRAVENOUS at 10:05

## 2022-05-29 RX ADMIN — ENOXAPARIN SODIUM 40 MG: 40 INJECTION SUBCUTANEOUS at 10:05

## 2022-05-29 RX ADMIN — VANCOMYCIN HYDROCHLORIDE 2000 MG: 10 INJECTION, POWDER, LYOPHILIZED, FOR SOLUTION INTRAVENOUS at 08:05

## 2022-05-29 RX ADMIN — Medication 0.1 MCG/KG/MIN: at 11:05

## 2022-05-29 RX ADMIN — SUCCINYLCHOLINE CHLORIDE 150 MG: 20 INJECTION, SOLUTION INTRAMUSCULAR; INTRAVENOUS at 06:05

## 2022-05-29 RX ADMIN — CHLORHEXIDINE GLUCONATE 0.12% ORAL RINSE 15 ML: 1.2 LIQUID ORAL at 10:05

## 2022-05-29 RX ADMIN — FAMOTIDINE 20 MG: 10 INJECTION INTRAVENOUS at 10:05

## 2022-05-29 RX ADMIN — PROPOFOL 50 MCG/KG/MIN: 10 INJECTION, EMULSION INTRAVENOUS at 06:05

## 2022-05-29 RX ADMIN — SODIUM CHLORIDE: 0.9 INJECTION, SOLUTION INTRAVENOUS at 10:05

## 2022-05-29 RX ADMIN — NALOXONE HYDROCHLORIDE 2 MG: 0.4 INJECTION, SOLUTION INTRAMUSCULAR; INTRAVENOUS; SUBCUTANEOUS at 06:05

## 2022-05-29 RX ADMIN — ONDANSETRON 8 MG: 2 INJECTION INTRAMUSCULAR; INTRAVENOUS at 06:05

## 2022-05-29 RX ADMIN — MIDAZOLAM 2 MG: 1 INJECTION INTRAMUSCULAR; INTRAVENOUS at 06:05

## 2022-05-29 RX ADMIN — DEXTROSE 0.04 MCG/KG/MIN: 5 SOLUTION INTRAVENOUS at 06:05

## 2022-05-29 RX ADMIN — CEFEPIME HYDROCHLORIDE 2 G: 2 INJECTION, SOLUTION INTRAVENOUS at 08:05

## 2022-05-29 RX ADMIN — Medication 25 MCG/HR: at 11:05

## 2022-05-29 RX ADMIN — LORAZEPAM 4 MG: 2 INJECTION INTRAMUSCULAR; INTRAVENOUS at 10:05

## 2022-05-29 RX ADMIN — Medication 0.1 MCG/KG/MIN: at 10:05
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
## 2022-05-30 PROBLEM — R65.20 SEVERE SEPSIS: Status: ACTIVE | Noted: 2022-05-29

## 2022-05-30 LAB
ALBUMIN SERPL BCP-MCNC: 3.6 G/DL (ref 3.5–5.2)
ALLENS TEST: ABNORMAL
ALP SERPL-CCNC: 68 U/L (ref 55–135)
ALT SERPL W/O P-5'-P-CCNC: 64 U/L (ref 10–44)
ANION GAP SERPL CALC-SCNC: 14 MMOL/L (ref 8–16)
AST SERPL-CCNC: 52 U/L (ref 10–40)
BASOPHILS # BLD AUTO: 0.03 K/UL (ref 0–0.2)
BASOPHILS NFR BLD: 0.5 % (ref 0–1.9)
BILIRUB SERPL-MCNC: 0.5 MG/DL (ref 0.1–1)
BUN SERPL-MCNC: 12 MG/DL (ref 6–20)
CALCIUM SERPL-MCNC: 8.2 MG/DL (ref 8.7–10.5)
CHLORIDE SERPL-SCNC: 105 MMOL/L (ref 95–110)
CO2 SERPL-SCNC: 25 MMOL/L (ref 23–29)
CREAT SERPL-MCNC: 1.1 MG/DL (ref 0.5–1.4)
DELSYS: ABNORMAL
DIFFERENTIAL METHOD: ABNORMAL
EOSINOPHIL # BLD AUTO: 0 K/UL (ref 0–0.5)
EOSINOPHIL NFR BLD: 0.2 % (ref 0–8)
ERYTHROCYTE [DISTWIDTH] IN BLOOD BY AUTOMATED COUNT: 13.2 % (ref 11.5–14.5)
ERYTHROCYTE [SEDIMENTATION RATE] IN BLOOD BY WESTERGREN METHOD: 24 MM/H
EST. GFR  (AFRICAN AMERICAN): >60 ML/MIN/1.73 M^2
EST. GFR  (NON AFRICAN AMERICAN): >60 ML/MIN/1.73 M^2
FIO2: 80
GLUCOSE SERPL-MCNC: 98 MG/DL (ref 70–110)
HCO3 UR-SCNC: 27 MMOL/L (ref 24–28)
HCT VFR BLD AUTO: 40.1 % (ref 40–54)
HGB BLD-MCNC: 14.1 G/DL (ref 14–18)
IMM GRANULOCYTES # BLD AUTO: 0.03 K/UL (ref 0–0.04)
IMM GRANULOCYTES NFR BLD AUTO: 0.5 % (ref 0–0.5)
LACTATE SERPL-SCNC: 2.2 MMOL/L (ref 0.5–2.2)
LYMPHOCYTES # BLD AUTO: 1.2 K/UL (ref 1–4.8)
LYMPHOCYTES NFR BLD: 18.8 % (ref 18–48)
MAGNESIUM SERPL-MCNC: 1.1 MG/DL (ref 1.6–2.6)
MAGNESIUM SERPL-MCNC: 2.1 MG/DL (ref 1.6–2.6)
MCH RBC QN AUTO: 33.1 PG (ref 27–31)
MCHC RBC AUTO-ENTMCNC: 35.2 G/DL (ref 32–36)
MCV RBC AUTO: 94 FL (ref 82–98)
MODE: ABNORMAL
MONOCYTES # BLD AUTO: 0.7 K/UL (ref 0.3–1)
MONOCYTES NFR BLD: 11.2 % (ref 4–15)
NEUTROPHILS # BLD AUTO: 4.3 K/UL (ref 1.8–7.7)
NEUTROPHILS NFR BLD: 68.8 % (ref 38–73)
NRBC BLD-RTO: 0 /100 WBC
PCO2 BLDA: 39.6 MMHG (ref 35–45)
PEEP: 8
PH SMN: 7.44 [PH] (ref 7.35–7.45)
PLATELET # BLD AUTO: 107 K/UL (ref 150–450)
PLATELET BLD QL SMEAR: ABNORMAL
PMV BLD AUTO: 9.6 FL (ref 9.2–12.9)
PO2 BLDA: 174 MMHG (ref 80–100)
POC BE: 3 MMOL/L
POC SATURATED O2: 100 % (ref 95–100)
POTASSIUM SERPL-SCNC: 3.6 MMOL/L (ref 3.5–5.1)
PROCALCITONIN SERPL IA-MCNC: 2.76 NG/ML
PROT SERPL-MCNC: 6.7 G/DL (ref 6–8.4)
RBC # BLD AUTO: 4.26 M/UL (ref 4.6–6.2)
SAMPLE: ABNORMAL
SITE: ABNORMAL
SODIUM SERPL-SCNC: 144 MMOL/L (ref 136–145)
TROPONIN I SERPL DL<=0.01 NG/ML-MCNC: 0.05 NG/ML (ref 0–0.03)
TROPONIN I SERPL DL<=0.01 NG/ML-MCNC: 0.05 NG/ML (ref 0–0.03)
VT: 460
WBC # BLD AUTO: 6.24 K/UL (ref 3.9–12.7)

## 2022-05-30 PROCEDURE — 99291 CRITICAL CARE FIRST HOUR: CPT | Mod: ,,, | Performed by: INTERNAL MEDICINE

## 2022-05-30 PROCEDURE — 80053 COMPREHEN METABOLIC PANEL: CPT | Performed by: INTERNAL MEDICINE

## 2022-05-30 PROCEDURE — 36600 WITHDRAWAL OF ARTERIAL BLOOD: CPT

## 2022-05-30 PROCEDURE — 25000003 PHARM REV CODE 250: Performed by: INTERNAL MEDICINE

## 2022-05-30 PROCEDURE — 63600175 PHARM REV CODE 636 W HCPCS: Performed by: INTERNAL MEDICINE

## 2022-05-30 PROCEDURE — 83605 ASSAY OF LACTIC ACID: CPT | Performed by: INTERNAL MEDICINE

## 2022-05-30 PROCEDURE — 99900026 HC AIRWAY MAINTENANCE (STAT)

## 2022-05-30 PROCEDURE — 84145 PROCALCITONIN (PCT): CPT | Performed by: INTERNAL MEDICINE

## 2022-05-30 PROCEDURE — 27000221 HC OXYGEN, UP TO 24 HOURS

## 2022-05-30 PROCEDURE — 99291 PR CRITICAL CARE, E/M 30-74 MINUTES: ICD-10-PCS | Mod: ,,, | Performed by: INTERNAL MEDICINE

## 2022-05-30 PROCEDURE — 21400001 HC TELEMETRY ROOM

## 2022-05-30 PROCEDURE — 84484 ASSAY OF TROPONIN QUANT: CPT | Performed by: INTERNAL MEDICINE

## 2022-05-30 PROCEDURE — 94003 VENT MGMT INPAT SUBQ DAY: CPT

## 2022-05-30 PROCEDURE — C9399 UNCLASSIFIED DRUGS OR BIOLOG: HCPCS | Performed by: INTERNAL MEDICINE

## 2022-05-30 PROCEDURE — 83735 ASSAY OF MAGNESIUM: CPT | Performed by: INTERNAL MEDICINE

## 2022-05-30 PROCEDURE — 82803 BLOOD GASES ANY COMBINATION: CPT

## 2022-05-30 PROCEDURE — 94760 N-INVAS EAR/PLS OXIMETRY 1: CPT

## 2022-05-30 PROCEDURE — 99900035 HC TECH TIME PER 15 MIN (STAT)

## 2022-05-30 PROCEDURE — 85025 COMPLETE CBC W/AUTO DIFF WBC: CPT | Performed by: INTERNAL MEDICINE

## 2022-05-30 PROCEDURE — 63600175 PHARM REV CODE 636 W HCPCS: Performed by: NURSE PRACTITIONER

## 2022-05-30 PROCEDURE — 83735 ASSAY OF MAGNESIUM: CPT | Mod: 91 | Performed by: INTERNAL MEDICINE

## 2022-05-30 RX ORDER — MAGNESIUM SULFATE HEPTAHYDRATE 40 MG/ML
2 INJECTION, SOLUTION INTRAVENOUS ONCE
Status: COMPLETED | OUTPATIENT
Start: 2022-05-30 | End: 2022-05-30

## 2022-05-30 RX ORDER — DEXMEDETOMIDINE HYDROCHLORIDE 4 UG/ML
0-1.4 INJECTION INTRAVENOUS CONTINUOUS
Status: DISCONTINUED | OUTPATIENT
Start: 2022-05-30 | End: 2022-05-31

## 2022-05-30 RX ORDER — MUPIROCIN 20 MG/G
OINTMENT TOPICAL 2 TIMES DAILY
Status: DISCONTINUED | OUTPATIENT
Start: 2022-05-30 | End: 2022-05-31 | Stop reason: HOSPADM

## 2022-05-30 RX ORDER — FAMOTIDINE 20 MG/1
20 TABLET, FILM COATED ORAL 2 TIMES DAILY
Status: DISCONTINUED | OUTPATIENT
Start: 2022-05-30 | End: 2022-05-31 | Stop reason: HOSPADM

## 2022-05-30 RX ORDER — AMOXICILLIN 250 MG
2 CAPSULE ORAL DAILY
Status: DISCONTINUED | OUTPATIENT
Start: 2022-05-30 | End: 2022-05-31 | Stop reason: HOSPADM

## 2022-05-30 RX ORDER — DOXYCYCLINE HYCLATE 100 MG
100 TABLET ORAL EVERY 12 HOURS
Status: DISCONTINUED | OUTPATIENT
Start: 2022-05-30 | End: 2022-05-31 | Stop reason: HOSPADM

## 2022-05-30 RX ORDER — POTASSIUM CHLORIDE 7.45 MG/ML
10 INJECTION INTRAVENOUS ONCE
Status: COMPLETED | OUTPATIENT
Start: 2022-05-30 | End: 2022-05-30

## 2022-05-30 RX ADMIN — POTASSIUM CHLORIDE 10 MEQ: 7.46 INJECTION, SOLUTION INTRAVENOUS at 05:05

## 2022-05-30 RX ADMIN — FOLIC ACID 1 MG: 5 INJECTION, SOLUTION INTRAMUSCULAR; INTRAVENOUS; SUBCUTANEOUS at 01:05

## 2022-05-30 RX ADMIN — ENOXAPARIN SODIUM 40 MG: 40 INJECTION SUBCUTANEOUS at 08:05

## 2022-05-30 RX ADMIN — DOXYCYCLINE 100 MG: 100 INJECTION, POWDER, LYOPHILIZED, FOR SOLUTION INTRAVENOUS at 07:05

## 2022-05-30 RX ADMIN — DOXYCYCLINE HYCLATE 100 MG: 100 TABLET, COATED ORAL at 09:05

## 2022-05-30 RX ADMIN — ENOXAPARIN SODIUM 40 MG: 40 INJECTION SUBCUTANEOUS at 09:05

## 2022-05-30 RX ADMIN — FOLIC ACID: 5 INJECTION, SOLUTION INTRAMUSCULAR; INTRAVENOUS; SUBCUTANEOUS at 10:05

## 2022-05-30 RX ADMIN — MAGNESIUM SULFATE 2 G: 2 INJECTION INTRAVENOUS at 05:05

## 2022-05-30 RX ADMIN — MUPIROCIN: 20 OINTMENT TOPICAL at 09:05

## 2022-05-30 RX ADMIN — CEFTRIAXONE 2 G: 2 INJECTION, SOLUTION INTRAVENOUS at 03:05

## 2022-05-30 RX ADMIN — PROPOFOL 50 MCG/KG/MIN: 10 INJECTION, EMULSION INTRAVENOUS at 07:05

## 2022-05-30 RX ADMIN — PROPOFOL 50 MCG/KG/MIN: 10 INJECTION, EMULSION INTRAVENOUS at 02:05

## 2022-05-30 RX ADMIN — MAGNESIUM SULFATE 2 G: 2 INJECTION INTRAVENOUS at 09:05

## 2022-05-30 RX ADMIN — FAMOTIDINE 20 MG: 20 TABLET ORAL at 09:05

## 2022-05-30 RX ADMIN — PROPOFOL 50 MCG/KG/MIN: 10 INJECTION, EMULSION INTRAVENOUS at 12:05

## 2022-05-30 RX ADMIN — LORAZEPAM 4 MG: 2 INJECTION INTRAMUSCULAR; INTRAVENOUS at 08:05

## 2022-05-30 RX ADMIN — FAMOTIDINE 20 MG: 10 INJECTION INTRAVENOUS at 08:05

## 2022-05-30 RX ADMIN — PROPOFOL 50 MCG/KG/MIN: 10 INJECTION, EMULSION INTRAVENOUS at 05:05

## 2022-05-30 RX ADMIN — THIAMINE HYDROCHLORIDE 100 MG: 100 INJECTION, SOLUTION INTRAMUSCULAR; INTRAVENOUS at 08:05

## 2022-05-30 RX ADMIN — MUPIROCIN: 20 OINTMENT TOPICAL at 08:05

## 2022-05-30 RX ADMIN — CHLORHEXIDINE GLUCONATE 0.12% ORAL RINSE 15 ML: 1.2 LIQUID ORAL at 08:05

## 2022-05-30 RX ADMIN — DEXMEDETOMIDINE HYDROCHLORIDE 0.2 MCG/KG/HR: 4 INJECTION INTRAVENOUS at 07:05

## 2022-05-30 NOTE — ASSESSMENT & PLAN NOTE
Monitor lactic acid.  Levophed.  Keep map above 65 mm Hg.  Rocephin azithromycin for suspected aspiration pneumonia

## 2022-05-30 NOTE — PROGRESS NOTES
Pharmacist Renal Dose Adjustment Note    Cl Saunders is a 41 y.o. male being treated with the medication enoxaparin    Patient Data:    Vital Signs (Most Recent):  Temp: 98.7 °F (37.1 °C) (05/29/22 1757)  Pulse: 88 (05/29/22 1949)  Resp: (!) 22 (05/29/22 1949)  BP: 118/62 (05/29/22 2042)  SpO2: 98 % (05/29/22 2003)   Vital Signs (72h Range):  Temp:  [98.7 °F (37.1 °C)]   Pulse:  []   Resp:  [19-23]   BP: ()/(40-62)   SpO2:  [95 %-100 %]      Recent Labs   Lab 05/29/22 1812   CREATININE 1.5*     Serum creatinine: 1.5 mg/dL (H) 05/29/22 1812  Estimated creatinine clearance: 95.3 mL/min (A)    Enoxaparin 40 mg q24h will be changed to enoxaparin 40 mg q12h for CrCl >30 mL/min and BMI >40.     Pharmacist's Name: Sara Waters  Pharmacist's Extension: [phone]

## 2022-05-30 NOTE — PLAN OF CARE
"CM met with patient at bedside. Patient alert and oriented, provided CM with alternate phone number for father, Cl ([phone]).    Patient lives alone, states he needs to "get himself together" and requested resources for inpatient ETOH rehab. Resource list provided, patient states upon d/c he will go home first and then drive self to rehab. Patient states he has [place] Medicaid but currently resides in [place] and sees Dr. Nish Metzger for PCP.    CM messaged Mame with [facility] regarding Medicaid Application. CM spoke with [facility] regarding insurance ([phone]). [facility] is the only facility that can take patient without [place] Medicaid through milena program. Staff recommend patient contact main admissions once he gets home and facility staff will be able to help direct him to appropriate resources. Info added to AVS.    CM updated patient at bedside.  "

## 2022-05-30 NOTE — CONSULTS
[facility] - Intensive Care (Hospital)  Adult Nutrition  Consult Note    SUMMARY     Recommendations    Recommendation/Intervention:   1. When medically appropriate, initiate Peptamen Intense VHP:   -Goal rate @ 30mL/hr.   -Provides 720 calories, 64g protein, and 604mL.   -Propofol provides 1137 calories @ rate of 43.1.   -100mL H2O flush q 4-6 hours or per MD/NP.   -Check Mg, K+, Na, Phos, and Glucose before and during initiation;Correct as indicated.    2. Weekly weights per RD follow up.     Goals:   1. Enteral Nutrition Initiation within 24 hours.    Nutrition Goal Status: new    Communication of RD Recs:  (Plan of care;Sticky Note)    Assessment and Plan    Nutrition Problem  Inadequate oral intake     Related to (etiology):   Decreased ability to consume sufficient energy     Signs and Symptoms (as evidenced by):   NPO, Intubated     Interventions/Recommendations (treatment strategy):  Enteral Nutrition Initiation   Collaboration with Medical Providers  Weekly Weights     Nutrition Diagnosis Status:   New        Malnutrition Assessment    Pt does not meet at least 2 ASPEN criteria for malnutrition at this time.  Will continue to monitor.     Reason for Assessment    Reason For Assessment: consult  Diagnosis: infection/sepsis  Relevant Medical History: HTN, H/O ETOH abuse  Interdisciplinary Rounds: attended  General Information Comments:     5/30:RD consulted for intubation. Patient is NPO and intubated for drug overdose. Patient is receiving propofol. Patient has no edema noted. Patients last BM is KAVON. Patient has no recent weights per past encounters. Will continue to monitor.    Nutrition Discharge Planning: pending medical course    Nutrition Risk Screen    Nutrition Risk Screen: tube feeding or parenteral nutrition    Nutrition/Diet History    Patient Reported Diet/Restrictions/Preferences:  (unknown)  Spiritual, Cultural Beliefs, Restorationism Practices, Values that Affect Care: no  Food Allergies:  NKFA  Factors Affecting Nutritional Intake: NPO, on mechanical ventilation    Anthropometrics    Temp: 99 °F (37.2 °C)  Height Method: Estimated  Height: 6' (182.9 cm)  Height (inches): 72 in  Weight Method: Bed Scale  Weight: (!) 140.3 kg (309 lb 4.9 oz)  Weight (lb): (!) 309.31 lb  Ideal Body Weight (IBW), Male: 178 lb  % Ideal Body Weight, Male (lb): 173.77 %  BMI (Calculated): 41.9  BMI Grade: greater than 40 - morbid obesity       Lab/Procedures/Meds  BMP  Lab Results   Component Value Date     05/30/2022    K 3.6 05/30/2022     05/30/2022    CO2 25 05/30/2022    BUN 12 05/30/2022    CREATININE 1.1 05/30/2022    CALCIUM 8.2 (L) 05/30/2022    ANIONGAP 14 05/30/2022    ESTGFRAFRICA >60 05/30/2022    EGFRNONAA >60 05/30/2022     Lab Results   Component Value Date     05/30/2022    K 3.6 05/30/2022     05/30/2022    CO2 25 05/30/2022     Lab Results   Component Value Date    LABPROT 10.1 08/27/2018    ALBUMIN 3.6 05/30/2022     Lab Results   Component Value Date    CALCIUM 8.2 (L) 05/30/2022     Pertinent Labs Reviewed: reviewed  Pertinent Medications Reviewed: reviewed  Scheduled Meds:   cefTRIAXone (ROCEPHIN) IVPB  2 g Intravenous Q24H    chlorhexidine  15 mL Mouth/Throat BID    doxycycline (VIBRAMYCIN) IVPB  100 mg Intravenous Q12H    enoxaparin  40 mg Subcutaneous Q12H    famotidine (PF)  20 mg Intravenous BID    multivit-min-ferrous gluconate  15 mL Oral Daily    mupirocin   Nasal BID    senna-docusate 8.6-50 mg  2 tablet Per OG tube Daily    thiamine (VITAMIN B1) IVPB  100 mg Intravenous Daily     Continuous Infusions:   sodium chloride 0.9% Stopped (05/30/22 0532)    dexmedetomidine (PRECEDEX) infusion 0.2 mcg/kg/hr (05/30/22 0745)    fentanyl 100 mcg/hr (05/30/22 0600)    NORepinephrine bitartrate-D5W Stopped (05/30/22 0149)    propofoL 50 mcg/kg/min (05/30/22 0719)     PRN Meds:.acetaminophen, albuterol-ipratropium, lorazepam, ondansetron    Physical  Findings/Assessment         Estimated/Assessed Needs    Weight Used For Calorie Calculations: (!) 140.3 kg (309 lb 4.9 oz)  Energy Calorie Requirements (kcal): 3445-3019 (11-14kcal/kg, permissive underfeeding, BMI >40)  Energy Need Method: Kcal/kg  Protein Requirements: 202 (>2.5g/kg, ICU, Intubated, BMI >40)  Weight Used For Protein Calculations: 80.9 kg (178 lb 5.6 oz) (IBW)  Fluid Requirements (mL): 1751-1071  Estimated Fluid Requirement Method: RDA Method  RDA Method (mL): 1543  CHO Requirement: 192-245      Nutrition Prescription Ordered    Current Diet Order: NPO    Evaluation of Received Nutrient/Fluid Intake    Lipid Calories (kcals): 1137 kcals (43.1)  % Kcal Needs: 73%  % Protein Needs: 0%  Energy Calories Required: meeting needs  Protein Required: not meeting needs  Fluid Required: not meeting needs  Tolerance:  (Patient NPO)  % Intake of Estimated Energy Needs: 50 - 75 %  % Meal Intake: NPO    Nutrition Risk    Level of Risk/Frequency of Follow-up: moderate - high       Monitor and Evaluation    Food and Nutrient Intake: energy intake, enteral nutrition intake  Food and Nutrient Adminstration: enteral and parenteral nutrition administration  Anthropometric Measurements: weight, weight change, body mass index  Biochemical Data, Medical Tests and Procedures: electrolyte and renal panel, gastrointestinal profile, glucose/endocrine profile, inflammatory profile, lipid profile  Nutrition-Focused Physical Findings: overall appearance       Nutrition Follow-Up    RD Follow-up?: Yes   Viktoria Mccrary RD,[place]

## 2022-05-30 NOTE — SUBJECTIVE & OBJECTIVE
Interval History:     Review of Systems   Unable to perform ROS: Intubated   Objective:     Vital Signs (Most Recent):  Temp: 99 °F (37.2 °C) (05/30/22 0330)  Pulse: 101 (05/30/22 0725)  Resp: (!) 26 (05/30/22 0725)  BP: 113/64 (05/30/22 0600)  SpO2: 98 % (05/30/22 0725)   Vital Signs (24h Range):  Temp:  [98.7 °F (37.1 °C)-99.3 °F (37.4 °C)] 99 °F (37.2 °C)  Pulse:  [] 101  Resp:  [17-27] 26  SpO2:  [95 %-100 %] 98 %  BP: ()/(40-65) 113/64     Weight: (!) 140.3 kg (309 lb 4.9 oz)  Body mass index is 41.95 kg/m².    Intake/Output Summary (Last 24 hours) at 5/30/2022 0850  Last data filed at 5/30/2022 0600  Gross per 24 hour   Intake 4233.07 ml   Output 680 ml   Net 3553.07 ml      Physical Exam  Vitals reviewed.   Constitutional:       Appearance: Normal appearance. He is obese.      Comments: Intubated.  Sedated.   HENT:      Head: Normocephalic and atraumatic.      Nose: Nose normal.   Eyes:      Extraocular Movements: Extraocular movements intact.      Pupils: Pupils are equal, round, and reactive to light.   Cardiovascular:      Rate and Rhythm: Normal rate and regular rhythm.      Pulses: Normal pulses.      Heart sounds: No murmur heard.    No friction rub.   Pulmonary:      Effort: Pulmonary effort is normal. No respiratory distress.      Comments: Symmetric chest rise.  Abdominal:      General: Abdomen is flat. There is no distension.      Palpations: Abdomen is soft. There is no mass.      Tenderness: There is no abdominal tenderness. There is no guarding or rebound.      Hernia: No hernia is present.   Musculoskeletal:         General: No deformity or signs of injury.      Right lower leg: No edema.      Left lower leg: No edema.   Skin:     General: Skin is warm and dry.   Neurological:      General: No focal deficit present.      Comments: Awakens from sedation and follows brief commands to move extremities.  He attempts to nod to questions, but responses are inconsistent.       Significant  Labs: All pertinent labs within the past 24 hours have been reviewed.      Significant Imaging: I have reviewed all pertinent imaging results/findings within the past 24 hours.
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
## 2022-05-30 NOTE — ASSESSMENT & PLAN NOTE
-creatinine elevated 1.5, unknown baseline.  -in the setting of septic shock and aspiration pneumonia, drug overdose.  -received LR boluses in the ED.  -continue NS at 125 cc/hour

## 2022-05-30 NOTE — ASSESSMENT & PLAN NOTE
Target map above 65 mm Hg.  Monitor lactic acid.  Rocephin azithromycin for suspected aspiration pneumonia.  Send blood culture and respiratory cultures.  Check procalcitonin.
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
## 2022-05-30 NOTE — ASSESSMENT & PLAN NOTE
-afebrile.  HR 130s.  Tachypneic.  WBC 17509, 0% bands, lactic acid 5.1.  -initial BP 88/50.  Currently on Levophed drip.  -titrate to maintain MAP > 65.  -continue NS at 125 cc/hour.

## 2022-05-30 NOTE — ASSESSMENT & PLAN NOTE
Target map above 65 mm Hg.     -Now off levophed  Procalcitonin elevated  Antibiotics for aspiration pneumonia.

## 2022-05-30 NOTE — PROGRESS NOTES
Pharmacist Renal Dose Adjustment Note    Cl Saunders is a 41 y.o. male being treated with the medication cefepime    Patient Data:    Vital Signs (Most Recent):  Temp: 98.7 °F (37.1 °C) (05/29/22 1757)  Pulse: 88 (05/29/22 1949)  Resp: (!) 22 (05/29/22 1949)  BP: 118/62 (05/29/22 2042)  SpO2: 98 % (05/29/22 2003)   Vital Signs (72h Range):  Temp:  [98.7 °F (37.1 °C)]   Pulse:  []   Resp:  [21-23]   BP: ()/(40-62)   SpO2:  [95 %-100 %]      Recent Labs   Lab 05/29/22 1812   CREATININE 1.5*     Serum creatinine: 1.5 mg/dL (H) 05/29/22 1812  Estimated creatinine clearance: 95.3 mL/min (A)    Cefepime 1 g q8h will be changed to cefepime 2 g q8h for CrCl >60 mL/min and severe infection (indication: lower respiratory infection).     Pharmacist's Name: Sara Waters  Pharmacist's Extension: [phone]

## 2022-05-30 NOTE — PROGRESS NOTES
Vancomycin Consult Note    Therapy with vancomycin complete and/or consult discontinued by provider.  Pharmacy will sgn off, please re-consult as needed.    Sara Waters PharmD  05/29/2022  9:52 PM

## 2022-05-30 NOTE — ASSESSMENT & PLAN NOTE
-elevated 5.1.  -received multiple LR boluses in the ED.  -continue NS at 125 cc/hour.  -trend lactic acid Q 4 x 2
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
## 2022-05-30 NOTE — PLAN OF CARE
Recommendation/Intervention: 5/30  1. When medically appropriate, initiate Peptamen Intense VHP:   -Goal rate @ 30mL/hr.   -Provides 720 calories, 64g protein, and 604mL.   -Propofol provides 1137 calories @ rate of 43.1.   -100mL H2O flush q 4-6 hours or per MD/NP.   -Check Mg, K+, Na, Phos, and Glucose before and during initiation;Correct as indicated.     2. Weekly weights per RD follow up.     Viktoria Mccrary RD,LDN

## 2022-05-30 NOTE — PROGRESS NOTES
Pharmacokinetic Initial Assessment: IV Vancomycin    Assessment/Plan:    Initiate intravenous vancomycin with loading dose of 2000 mg once with subsequent doses when random concentrations are less than 20 mcg/mL  Desired empiric serum trough concentration is 15 to 20 mcg/mL  Draw vancomycin random level on 05/30 at 0830.  Pharmacy will continue to follow and monitor vancomycin.      Please contact pharmacy at extension [phone] with any questions regarding this assessment.     Thank you for the consult,   Sara Waters       Patient brief summary:  Cl Saunders is a 41 y.o. male initiated on antimicrobial therapy with IV Vancomycin for treatment of suspected sepsis    Drug Allergies:   Review of patient's allergies indicates:  No Known Allergies    Actual Body Weight:   143.5 kg    Renal Function:   Estimated Creatinine Clearance: 95.3 mL/min (A) (based on SCr of 1.5 mg/dL (H)).,     Dialysis Method (if applicable):  N/A - BAUDILIO    CBC (last 72 hours):  Recent Labs   Lab Result Units 05/29/22  1812   WBC K/uL 13.79*   Hemoglobin g/dL 15.4   Hematocrit % 45.2   Platelets K/uL 171   Gran % % 68.1   Lymph % % 23.3   Mono % % 6.8   Eosinophil % % 0.2   Basophil % % 0.4   Differential Method  Automated       Metabolic Panel (last 72 hours):  Recent Labs   Lab Result Units 05/29/22  1812 05/29/22  1856 05/29/22  1857   Sodium mmol/L 146*  --   --    Potassium mmol/L 4.2  --   --    Chloride mmol/L 106  --   --    CO2 mmol/L 22*  --   --    Glucose mg/dL 135*  --   --    Glucose, UA   --   --  Negative   BUN mg/dL 13  --   --    Creatinine mg/dL 1.5*  --   --    Creatinine, Urine mg/dL  --  118.9  --    Albumin g/dL 3.7  --   --    Total Bilirubin mg/dL 0.3  --   --    Alkaline Phosphatase U/L 73  --   --    AST U/L 69*  --   --    ALT U/L 72*  --   --        Drug levels (last 3 results):  No results for input(s): VANCOMYCINRA, VANCORANDOM, VANCOMYCINPE, VANCOPEAK, VANCOMYCINTR, VANCOTROUGH in the last 72  hours.    Microbiologic Results:  Microbiology Results (last 7 days)       Procedure Component Value Units Date/Time    Blood culture #1 **CANNOT BE ORDERED STAT** [[id number]] Collected: 05/29/22 1956    Order Status: Sent Specimen: Blood from Peripheral, Hand, Left Updated: 05/29/22 1957    Blood culture #2 **CANNOT BE ORDERED STAT** [[id number]] Collected: 05/29/22 1956    Order Status: Sent Specimen: Blood from Peripheral, Antecubital, Right Updated: 05/29/22 1956
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
## 2022-05-30 NOTE — ASSESSMENT & PLAN NOTE
Patient with Hypercapnic and Hypoxic Respiratory failure which is Acute.  he is not on home oxygen. Supplemental oxygen was provided and noted- Vent Mode: A/C  Oxygen Concentration (%):  [100] 100  Resp Rate Total:  [0 br/min-25 br/min] 23 br/min  Vt Set:  [460 mL] 460 mL  PEEP/CPAP:  [5 cmH20] 5 cmH20  Pressure Support:  [0 cmH20] 0 cmH20  Mean Airway Pressure:  [0 ziT90-61 cmH20] 13 cmH20.   Signs/symptoms of respiratory failure include- increased work of breathing and respiratory distress. Contributing diagnoses includes - Aspiration, Obesity Hypoventilation and Pneumonia Labs and images were reviewed. Patient Has recent ABG, which has been reviewed. Will treat underlying causes and adjust management of respiratory failure as follows- O2/MV/pulmonary toilet increase PEEP to 8 cm water.  Target sat above 90%.  Wean FiO2 from 100%.  Permissive hypercapnia.  Treat aspiration pneumonia

## 2022-05-30 NOTE — ASSESSMENT & PLAN NOTE
Patient has received resuscitation fluids.  Monitor lactate until normal.  Hemodynamic support for goal map greater than 65 as needed.

## 2022-05-30 NOTE — PROGRESS NOTES
Pharmacist Intervention IV to PO Note    lC Saunders is a 41 y.o. male being treated with IV medication famotidine    Patient Data:    Vital Signs (Most Recent):  Temp: 99.2 °F (37.3 °C) (05/30/22 1200)  Pulse: 95 (05/30/22 1400)  Resp: 15 (05/30/22 1400)  BP: 127/71 (05/30/22 1300)  SpO2: 95 % (05/30/22 1400) Vital Signs (72h Range):  Temp:  [98.7 °F (37.1 °C)-100.5 °F (38.1 °C)]   Pulse:  []   Resp:  [15-27]   BP: ()/(40-78)   SpO2:  [93 %-100 %]      CBC:  Recent Labs   Lab 05/29/22 1812 05/30/22  0257   WBC 13.79* 6.24   RBC 4.77 4.26*   HGB 15.4 14.1   HCT 45.2 40.1    107*   MCV 95 94   MCH 32.3* 33.1*   MCHC 34.1 35.2     CMP:     Recent Labs   Lab 05/29/22 1812 05/30/22  0257   * 98   CALCIUM 8.4* 8.2*   ALBUMIN 3.7 3.6   PROT 6.7 6.7   * 144   K 4.2 3.6   CO2 22* 25    105   BUN 13 12   CREATININE 1.5* 1.1   ALKPHOS 73 68   ALT 72* 64*   AST 69* 52*   BILITOT 0.3 0.5       Dietary Orders:  Diet Orders  Report           Diet Adult Regular (IDDSI Level 7): Regular starting at 05/30 1133            Based on the following criteria, this patient qualifies for intravenous to oral conversion:  [x] The patients gastrointestinal tract is functioning (tolerating medications via oral or enteral route for 24 hours and tolerating food or enteral feeds for 24 hours).  [x] The patient is hemodynamically stable for 24 hours (heart rate <100 beats per minute, systolic blood pressure >99 mm Hg, and respiratory rate <20 breaths per minute).  [x] The patient shows clinical improvement (afebrile for at least 24 hours and white blood cell count downtrending or normalized). Additionally, the patient must be non-neutropenic (absolute neutrophil count >500 cells/mm3).  [x] For antimicrobials, the patient has received IV therapy for at least 24 hours.    IV medication Famotidine 20 mg BID will be changed to oral medication Famotidine 20 mg BID    Pharmacist's Name: Gabrielle Landin,  PharmD  Pharmacist's Extension: [phone]

## 2022-05-30 NOTE — ASSESSMENT & PLAN NOTE
Latest Reference Range & Units 05/29/22 18:12   Alcohol, Serum <10 mg/dL 182 (H)   Acetaminophen (Tylenol), Serum 10.0 - 20.0 ug/mL <3.0 (L)       IV fluid multivitamin thiamine folate

## 2022-05-30 NOTE — SUBJECTIVE & OBJECTIVE
Interval History:   Unable to obtain interval history or ROS from patient due to intubation and sedation.  Patient is only able to briefly wake and follow.  While he does respond to communication, responses are inconsistent and not reliable.      Objective:     Vital Signs (Most Recent):  Temp: 99 °F (37.2 °C) (05/30/22 0330)  Pulse: 101 (05/30/22 0725)  Resp: (!) 26 (05/30/22 0725)  BP: 113/64 (05/30/22 0600)  SpO2: 98 % (05/30/22 0725)   Vital Signs (24h Range):  Temp:  [98.7 °F (37.1 °C)-99.3 °F (37.4 °C)] 99 °F (37.2 °C)  Pulse:  [] 101  Resp:  [17-27] 26  SpO2:  [95 %-100 %] 98 %  BP: ()/(40-65) 113/64     Weight: (!) 140.3 kg (309 lb 4.9 oz)  Body mass index is 41.95 kg/m².      Intake/Output Summary (Last 24 hours) at 5/30/2022 0803  Last data filed at 5/30/2022 0600  Gross per 24 hour   Intake 4233.07 ml   Output 680 ml   Net 3553.07 ml       Physical Exam  Vitals reviewed.   Constitutional:       Appearance: Normal appearance. He is obese.      Comments: Intubated.  Sedated.   HENT:      Head: Normocephalic and atraumatic.      Nose: Nose normal.   Eyes:      Extraocular Movements: Extraocular movements intact.      Pupils: Pupils are equal, round, and reactive to light.   Cardiovascular:      Rate and Rhythm: Normal rate and regular rhythm.   Pulmonary:      Effort: Pulmonary effort is normal. No respiratory distress.      Comments: Symmetric chest rise.  Abdominal:      General: Abdomen is flat. There is no distension.      Palpations: Abdomen is soft.   Musculoskeletal:         General: No deformity or signs of injury.      Right lower leg: No edema.      Left lower leg: No edema.   Skin:     General: Skin is warm and dry.   Neurological:      General: No focal deficit present.      Comments: Awakens from sedation and follows brief commands to move extremities.  He attempts to nod to questions, but responses are inconsistent.       Vents:  Vent Mode: A/C (05/30/22 0725)  Ventilator Initiated:  Yes (05/29/22 1810)  Set Rate: 24 BPM (05/30/22 0725)  Vt Set: 460 mL (05/30/22 0725)  Pressure Support: 0 cmH20 (05/30/22 0725)  PEEP/CPAP: 8 cmH20 (05/30/22 0725)  Oxygen Concentration (%): 65 (05/30/22 0725)  Peak Airway Pressure: 31 cmH2O (05/30/22 0725)  Plateau Pressure: 20 cmH20 (05/30/22 0725)  Total Ve: 11.7 mL (05/30/22 0725)  F/VT Ratio<105 (RSBI): (!) 58.56 (05/30/22 0725)    Lines/Drains/Airways       Central Venous Catheter Line  Duration             Percutaneous Central Line Insertion/Assessment - Triple Lumen  05/29/22 left subclavian 1 day              Drain  Duration                  NG/OG Tube 05/29/22 1849 orogastric 18 Fr. Center mouth <1 day         Urethral Catheter 05/29/22 1924 Non-latex 16 Fr. <1 day              Airway  Duration                  Airway - Non-Surgical 05/29/22 1805 Endotracheal Tube <1 day              Peripheral Intravenous Line  Duration                  Peripheral IV - Single Lumen 05/29/22 18 G Left Antecubital 1 day         Peripheral IV - Single Lumen 05/29/22 1759 18 G Right Antecubital <1 day                    Significant Labs:    CBC/Anemia Profile:  Recent Labs   Lab 05/29/22 1812 05/30/22 0257   WBC 13.79* 6.24   HGB 15.4 14.1   HCT 45.2 40.1    107*   MCV 95 94   RDW 13.1 13.2        Chemistries:  Recent Labs   Lab 05/29/22 1812 05/30/22  0257   * 144   K 4.2 3.6    105   CO2 22* 25   BUN 13 12   CREATININE 1.5* 1.1   CALCIUM 8.4* 8.2*   ALBUMIN 3.7 3.6   PROT 6.7 6.7   BILITOT 0.3 0.5   ALKPHOS 73 68   ALT 72* 64*   AST 69* 52*   MG  --  1.1*       A1C: No results for input(s): HGBA1C in the last 48 hours.  Blood Culture:   Recent Labs   Lab 05/29/22 1956   LABBLOO No Growth to date  No Growth to date     Cardiac Markers: No results for input(s): CKMB, TROPONINT, MYOGLOBIN in the last 48 hours.  Lactic Acid:   Recent Labs   Lab 05/29/22  1849 05/29/22  2230 05/30/22  0257   LACTATE 5.6* 2.1 2.2     Respiratory Culture: No results for  input(s): GSRESP, RESPIRATORYC in the last 48 hours.    Significant Imaging:  I have reviewed all pertinent imaging results/findings within the past 24 hours.    CT C/A/P 5/29:     Impression:     Moderate bilateral lower lobe consolidation mild upper lobe scarring.  Endotracheal tube enteric tube Garcia catheter left brachiocephalic vein central venous line identified.  Prior umbilical hernia repair.  Punctate left renal calculi.  Status post cholecystectomy.  Otherwise no definite acute process seen.      CT Head 5/29:     Impression:     No acute abnormality.    CXR 5/29:  FINDINGS:  Gas-filled structure in the upper abdomen likely related to in distended esophagus.  Endotracheal tube below the clavicle above the luis satisfactory as far seen.  Low lung volumes.  Left IJ central venous catheter in the brachiocephalic vein.  Enteric tube coursing along the midline with tip projecting in the region of the presumed dilated stomach.  Prominent cardiac silhouette.     Bones are intact.

## 2022-05-30 NOTE — ASSESSMENT & PLAN NOTE
-2/2 to sepsis and volume depletion   -Cont IVF  -Monitor UOP and kidney function   - Improving

## 2022-05-30 NOTE — ASSESSMENT & PLAN NOTE
Patient with Hypercapnic Respiratory failure which is Acute.  he is not on home oxygen. Supplemental oxygen was provided and noted- Vent Mode: A/C  Oxygen Concentration (%):  [100] 100  Resp Rate Total:  [0 br/min-25 br/min] 23 br/min  Vt Set:  [460 mL] 460 mL  PEEP/CPAP:  [5 cmH20] 5 cmH20  Pressure Support:  [0 cmH20] 0 cmH20  Mean Airway Pressure:  [0 exB35-58 cmH20] 13 cmH20.   Signs/symptoms of respiratory failure include- respiratory distress. Contributing diagnoses includes - Aspiration and Pneumonia Labs and images were reviewed. Patient Has recent ABG, which has been reviewed. Will treat underlying causes and adjust management of respiratory failure as follows-     -currently endotracheally intubated.  -consult Pulmonary for ventilator management.  -IV antibiotics for aspiration pneumonia.

## 2022-05-30 NOTE — PLAN OF CARE
POC reviewed. Pt admitted to unit this shift. Weaned levo off and added fentanyl as pt wakes up and is agitated about being intubated. Pt does follow commands and tries to communicate while awake. Urine output >30ml/hr but has sediment and is cloudy. Restraints applied this shift for pt safety. Report to be given to day shift RN who will assume care.

## 2022-05-30 NOTE — ASSESSMENT & PLAN NOTE
-CT chest reveals bilateral lower lobe consolidation.  -IV vancomycin, cefepime empirically.  -follow up on cultures.  -bronchodilators.

## 2022-05-30 NOTE — ASSESSMENT & PLAN NOTE
Target map above 65 mm Hg.     -Now off levophed  Procalcitonin elevated  Rocephin azithromycin for suspected aspiration pneumonia.

## 2022-05-30 NOTE — ASSESSMENT & PLAN NOTE
Latest Reference Range & Units 05/29/22 18:12   Alcohol, Serum <10 mg/dL 182 (H)   Acetaminophen (Tylenol), Serum 10.0 - 20.0 ug/mL <3.0 (L)       IV fluid. multivitamin thiamine folate
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
## 2022-05-30 NOTE — PROGRESS NOTES
Pharmacist Renal Dose Adjustment Note    Cl Saunders is a 41 y.o. male being treated with the medication cefepime    Patient Data:    Vital Signs (Most Recent):  Temp: 98.7 °F (37.1 °C) (05/29/22 1757)  Pulse: 88 (05/29/22 1949)  Resp: (!) 22 (05/29/22 1949)  BP: (!) 106/59 (05/29/22 2016)  SpO2: 98 % (05/29/22 2003)   Vital Signs (72h Range):  Temp:  [98.7 °F (37.1 °C)]   Pulse:  []   Resp:  [21-23]   BP: ()/(40-59)   SpO2:  [95 %-100 %]      Recent Labs   Lab 05/29/22 1812   CREATININE 1.5*     Serum creatinine: 1.5 mg/dL (H) 05/29/22 1812  Estimated creatinine clearance: 95.3 mL/min (A)    Cefepime 1 g ED 1 Time order will be changed to cefepime 2 g ED 1 Time order for severe infection (indication: sepsis).     Pharmacist's Name: Sara Waters  Pharmacist's Extension: [phone]

## 2022-05-30 NOTE — ASSESSMENT & PLAN NOTE
Patient with Hypercapnic Respiratory failure which is Acute.  he is not on home oxygen. Supplemental oxygen was provided and noted- Vent Mode: A/C  Oxygen Concentration (%):  [] 65  Resp Rate Total:  [0 br/min-32 br/min] 24 br/min  Vt Set:  [460 mL] 460 mL  PEEP/CPAP:  [5 cmH20-8 cmH20] 8 cmH20  Pressure Support:  [0 cmH20] 0 cmH20  Mean Airway Pressure:  [0 cmH20-15 cmH20] 15 cmH20.   Signs/symptoms of respiratory failure include- respiratory distress. Contributing diagnoses includes - Aspiration and Pneumonia Labs and images were reviewed. Patient Has recent ABG, which has been reviewed. Will treat underlying causes and adjust management of respiratory failure as follows-     -currently endotracheally intubated.  -consult Pulmonary for ventilator management.  -IV antibiotics for aspiration pneumonia.

## 2022-05-30 NOTE — HOSPITAL COURSE
5/29: Intubated status post left subclavian  central line.  OG tube placed.  Received 2 L of LR bolus.  Chest x-ray CT head CT chest abdomen pelvis reviewed.  ABG reviewed started on propofol and on levophed gtt .    5/30:  Remains intubated.  PEEP 8, FiO2 65%.  Here with propofol and fentanyl drips.  Levophed now off.   Patient is lethargic on sedation, but is able to briefly wake and follow commands.

## 2022-05-30 NOTE — SUBJECTIVE & OBJECTIVE
"Past Medical History:   Diagnosis Date    H/O ETOH abuse     Hypertension        Past Surgical History:   Procedure Laterality Date    ADHD      TONSILLECTOMY         Review of patient's allergies indicates:  No Known Allergies    Family History       Problem Relation (Age of Onset)    Hypertension Father, Maternal Grandmother, Maternal Grandfather          Tobacco Use    Smoking status: Never Smoker    Smokeless tobacco: Current User     Types: Chew   Substance and Sexual Activity    Alcohol use: Yes     Alcohol/week: 14.0 standard drinks     Types: 14 Glasses of wine per week    Drug use: Yes     Types: Marijuana     Comment: "sometimes marijuana"    Sexual activity: Not on file         Review of Systems   Unable to perform ROS: Intubated   Objective:     Vital Signs (Most Recent):  Temp: 98.7 °F (37.1 °C) (05/29/22 1757)  Pulse: 88 (05/29/22 1949)  Resp: (!) 22 (05/29/22 1949)  BP: 118/62 (05/29/22 2042)  SpO2: 98 % (05/29/22 2003)   Vital Signs (24h Range):  Temp:  [98.7 °F (37.1 °C)] 98.7 °F (37.1 °C)  Pulse:  [] 88  Resp:  [19-23] 22  SpO2:  [95 %-100 %] 98 %  BP: ()/(40-62) 118/62     Weight: (!) 143.5 kg (316 lb 5.8 oz)  Body mass index is 42.91 kg/m².      Intake/Output Summary (Last 24 hours) at 5/29/2022 2120  Last data filed at 5/29/2022 2101  Gross per 24 hour   Intake 2050 ml   Output --   Net 2050 ml       Physical Exam  Vitals and nursing note reviewed.   Constitutional:       General: He is not in acute distress.     Appearance: He is well-developed. He is obese. He is ill-appearing and toxic-appearing.   HENT:      Head: Normocephalic and atraumatic.      Nose: Nose normal.      Mouth/Throat:      Comments: ET tube OGT   Eyes:      Extraocular Movements: Extraocular movements intact.   Cardiovascular:      Rate and Rhythm: Regular rhythm. Tachycardia present.   Pulmonary:      Effort: Respiratory distress present.      Breath sounds: No stridor. Rhonchi present.   Abdominal:      " General: There is no distension.   Musculoskeletal:         General: Deformity present. No signs of injury.      Cervical back: Normal range of motion and neck supple.   Skin:     General: Skin is warm and dry.   Neurological:      General: No focal deficit present.      Mental Status: He is oriented to person, place, and time.   Psychiatric:         Behavior: Behavior normal.       Vents:  Vent Mode: A/C (05/29/22 1949)  Ventilator Initiated: Yes (05/29/22 1810)  Set Rate: 20 BPM (05/29/22 1949)  Vt Set: 460 mL (05/29/22 1949)  Pressure Support: 0 cmH20 (05/29/22 1949)  PEEP/CPAP: 5 cmH20 (05/29/22 1949)  Oxygen Concentration (%): 100 (05/29/22 1949)  Peak Airway Pressure: 29 cmH2O (05/29/22 1949)  Plateau Pressure: 0 cmH20 (05/29/22 1949)  Total Ve: 11.2 mL (05/29/22 1949)  F/VT Ratio<105 (RSBI): (!) 41.59 (05/29/22 1949)    Lines/Drains/Airways       Central Venous Catheter Line  Duration             Percutaneous Central Line Insertion/Assessment - Triple Lumen  05/29/22 left subclavian <1 day              Drain  Duration                  NG/OG Tube 05/29/22 1849 orogastric 18 Fr. Center mouth <1 day              Airway  Duration                  Airway - Non-Surgical 05/29/22 1805 Endotracheal Tube <1 day              Peripheral Intravenous Line  Duration                  Peripheral IV - Single Lumen 05/29/22 1759 18 G Right Antecubital <1 day         Peripheral IV - Single Lumen 05/29/22 18 G Left Antecubital <1 day                    Significant Labs:    CBC/Anemia Profile:  Recent Labs   Lab 05/29/22 1812   WBC 13.79*   HGB 15.4   HCT 45.2      MCV 95   RDW 13.1        Chemistries:  Recent Labs   Lab 05/29/22 1812   *   K 4.2      CO2 22*   BUN 13   CREATININE 1.5*   CALCIUM 8.4*   ALBUMIN 3.7   PROT 6.7   BILITOT 0.3   ALKPHOS 73   ALT 72*   AST 69*      Latest Reference Range & Units 05/29/22 21:04   POC PH 7.35 - 7.45  7.324 (L)   POC PCO2 35 - 45 mmHg 50.9 (H)   POC PO2 80 - 100 mmHg  162 (H)   POC BE -2 to 2 mmol/L 0   POC HCO3 24 - 28 mmol/L 26.5   POC SATURATED O2 95 - 100 % 99   FiO2  100   Vt  460   PEEP  5   Sample  ARTERIAL   DelSys  Adult Vent   Allens Test  Pass   Site  RR   Mode  AC/PRVC   Rate  24   (L): Data is abnormally low  (H): Data is abnormally high   Latest Reference Range & Units 05/29/22 18:56   Benzodiazepines Negative  Negative   Methadone metabolites Negative  Negative   Phencyclidine Negative  Negative   Toxicology Information  SEE COMMENT   Cocaine (Metab.) Negative  Negative   Opiate Scrn, Ur Negative  Negative   Barbiturate Screen, Ur Negative  Negative   Amphetamine Screen, Ur Negative  Negative   Marijuana (THC) Metabolite Negative  Presumptive Positive !      Latest Reference Range & Units 05/29/22 18:57   Color, UA Yellow, Straw, Graciela  Yellow   Appearance, UA Clear  Clear   Specific Gravity, UA 1.005 - 1.030  1.025   pH, UA 5.0 - 8.0  6.0   Protein, UA Negative  2+ !   Glucose, UA Negative  Negative   Ketones, UA Negative  Negative   Occult Blood UA Negative  Trace !   NITRITE UA Negative  Negative   UROBILINOGEN UA <2.0 EU/dL Negative   Bilirubin (UA) Negative  Negative   Leukocytes, UA Negative  Negative   RBC, UA 0 - 4 /hpf 1   WBC, UA 0 - 5 /hpf 1   Bacteria, UA None-Occ /hpf Rare   Squam Epithel, UA /hpf 1   Hyaline Casts, UA 0-1/lpf /lpf 1   Microscopic Comment  SEE COMMENT   !: Data is abnormal          Significant Imaging:         Chest x-ray 05/29/2022     COMPARISON:  None     FINDINGS:  Gas-filled structure in the upper abdomen likely related to in distended esophagus.  Endotracheal tube below the clavicle above the luis satisfactory as far seen.  Low lung volumes.  Left IJ central venous catheter in the brachiocephalic vein.  Enteric tube coursing along the midline with tip projecting in the region of the presumed dilated stomach.  Prominent cardiac silhouette.            CT chest abdomen pelvis 05/29/2022    COMPARISON:  None     FINDINGS:  Moderate  consolidation in the bilateral lower lobes.  Linear scarring bilateral upper lobes.  Endotracheal tube enteric tube identified.  Left IJ central venous catheter in the brachiocephalic vein.  Hepatomegaly with suggestion of fatty infiltration of the liver.  Status post cholecystectomy.  Splenic granulomas.  Pancreas adrenal glands are grossly unremarkable.  Nonobstructing punctate left renal calculi.  Chronic bilateral perinephric fat stranding.  No bowel obstruction.  No free fluid.  No secondary signs of appendicitis.  Prior abdominal wall mesh repair.  Small fat containing residual umbilical hernia.  Normal appendix.  Garcia catheter in place.  Granuloma in the right lower lobe peribronchial region.     Impression:     Moderate bilateral lower lobe consolidation mild upper lobe scarring.  Endotracheal tube enteric tube Garcia catheter left brachiocephalic vein central venous line identified.  Prior umbilical hernia repair.  Punctate left renal calculi.  Status post cholecystectomy.  Otherwise no definite acute process seen.

## 2022-05-30 NOTE — ASSESSMENT & PLAN NOTE
-CT chest reveals bilateral lower lobe consolidation.  -Cont IVAB   -follow up on cultures.  -bronchodilators.

## 2022-05-30 NOTE — ASSESSMENT & PLAN NOTE
Patient with Hypercapnic and Hypoxic Respiratory failure which is Acute.  he is not on home oxygen. Supplemental oxygen was provided and noted- Vent Mode: A/C  Oxygen Concentration (%):  [] 65  Resp Rate Total:  [0 br/min-32 br/min] 24 br/min  Vt Set:  [460 mL] 460 mL  PEEP/CPAP:  [5 cmH20-8 cmH20] 8 cmH20  Pressure Support:  [0 cmH20] 0 cmH20  Mean Airway Pressure:  [0 cmH20-15 cmH20] 15 cmH20.   Signs/symptoms of respiratory failure include- increased work of breathing and respiratory distress. Contributing diagnoses includes - Aspiration, Obesity Hypoventilation and Pneumonia Labs and images were reviewed. Patient Has recent ABG, which has been reviewed. Will treat underlying causes and adjust management of respiratory failure as follows-     Maintain PEEP greater than 8 given body habitus.  Target sat above 90%.  Wean FiO2  Wean vent as able.  SAT/SBT as able.  Work towards extubation today and in the coming days.  Treat aspiration pneumonia
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
## 2022-05-30 NOTE — EICU
Eicu brief admission review note.  Pt was examined on video, notes, images, labs  reviewed.   42 y/o with h/o ETOH abuse, HTN presented with AMS, found to be in hypoxemic and hypercapneic respiratory failure required intubation, septic shock from PNA.  Septic shock from PNA, likely aspiration - on Ceftriaxone, doxy, s/p 3.2 liters septic bolus, on levophed, follow cxs, procal, [place]  Acute hypercapnic and hypoxemic respiratory failure, vent settings reviewed, been seen by Pulmonary, on propofol and fentanyl   BAUDILIO- likely prerenal, CT no hydronephrosis, on NS@ 125 cc/h, follow up lytes, UO  AMS likely metabolic encephalopathy, etoh +, marijuana+  H/o ETOH use - thiamine, folic acid, on propofol qtt now  DVT proph- Lovenox  D/w RN

## 2022-05-31 ENCOUNTER — HOSPITAL ENCOUNTER (INPATIENT)
Facility: HOSPITAL | Age: 42
LOS: 1 days | Discharge: HOME | End: 2022-06-02
Attending: EMERGENCY MEDICINE | Admitting: INTERNAL MEDICINE
Payer: MEDICAID

## 2022-05-31 VITALS
BODY MASS INDEX: 41.39 KG/M2 | SYSTOLIC BLOOD PRESSURE: 171 MMHG | WEIGHT: 305.56 LBS | HEART RATE: 91 BPM | TEMPERATURE: 98 F | OXYGEN SATURATION: 92 % | DIASTOLIC BLOOD PRESSURE: 86 MMHG | RESPIRATION RATE: 18 BRPM | HEIGHT: 72 IN

## 2022-05-31 DIAGNOSIS — R41.3 AMNESIA MEMORY LOSS: ICD-10-CM

## 2022-05-31 DIAGNOSIS — R41.82 ALTERED MENTAL STATUS, UNSPECIFIED ALTERED MENTAL STATUS TYPE: Primary | ICD-10-CM

## 2022-05-31 LAB
ALBUMIN SERPL BCP-MCNC: 3.8 G/DL (ref 3.5–5.2)
ALP SERPL-CCNC: 67 U/L (ref 55–135)
ALT SERPL W/O P-5'-P-CCNC: 44 U/L (ref 10–44)
AMPHET+METHAMPHET UR QL: NEGATIVE
ANION GAP SERPL CALC-SCNC: 11 MMOL/L (ref 8–16)
ANION GAP SERPL CALC-SCNC: 13 MMOL/L (ref 8–16)
AST SERPL-CCNC: 40 U/L (ref 10–40)
BACTERIA #/AREA URNS HPF: ABNORMAL /HPF
BARBITURATES UR QL SCN>200 NG/ML: NEGATIVE
BASOPHILS # BLD AUTO: 0.01 K/UL (ref 0–0.2)
BASOPHILS # BLD AUTO: 0.02 K/UL (ref 0–0.2)
BASOPHILS NFR BLD: 0.2 % (ref 0–1.9)
BASOPHILS NFR BLD: 0.5 % (ref 0–1.9)
BENZODIAZ UR QL SCN>200 NG/ML: ABNORMAL
BILIRUB SERPL-MCNC: 1.3 MG/DL (ref 0.1–1)
BILIRUB UR QL STRIP: NEGATIVE
BILIRUB UR QL STRIP: NEGATIVE
BUN SERPL-MCNC: 11 MG/DL (ref 6–20)
BUN SERPL-MCNC: 9 MG/DL (ref 6–20)
BZE UR QL SCN: NEGATIVE
CALCIUM SERPL-MCNC: 7.9 MG/DL (ref 8.7–10.5)
CALCIUM SERPL-MCNC: 8.9 MG/DL (ref 8.7–10.5)
CANNABINOIDS UR QL SCN: ABNORMAL
CHLORIDE SERPL-SCNC: 103 MMOL/L (ref 95–110)
CHLORIDE SERPL-SCNC: 106 MMOL/L (ref 95–110)
CK SERPL-CCNC: 408 U/L (ref 20–200)
CK SERPL-CCNC: 495 U/L (ref 20–200)
CLARITY UR: CLEAR
CLARITY UR: CLEAR
CO2 SERPL-SCNC: 24 MMOL/L (ref 23–29)
CO2 SERPL-SCNC: 25 MMOL/L (ref 23–29)
COLOR UR: YELLOW
COLOR UR: YELLOW
CREAT SERPL-MCNC: 0.9 MG/DL (ref 0.5–1.4)
CREAT SERPL-MCNC: 0.9 MG/DL (ref 0.5–1.4)
CREAT UR-MCNC: 168.3 MG/DL (ref 23–375)
DIFFERENTIAL METHOD: ABNORMAL
DIFFERENTIAL METHOD: ABNORMAL
EOSINOPHIL # BLD AUTO: 0 K/UL (ref 0–0.5)
EOSINOPHIL # BLD AUTO: 0.1 K/UL (ref 0–0.5)
EOSINOPHIL NFR BLD: 1 % (ref 0–8)
EOSINOPHIL NFR BLD: 1.1 % (ref 0–8)
ERYTHROCYTE [DISTWIDTH] IN BLOOD BY AUTOMATED COUNT: 12.4 % (ref 11.5–14.5)
ERYTHROCYTE [DISTWIDTH] IN BLOOD BY AUTOMATED COUNT: 12.4 % (ref 11.5–14.5)
EST. GFR  (AFRICAN AMERICAN): >60 ML/MIN/1.73 M^2
EST. GFR  (AFRICAN AMERICAN): >60 ML/MIN/1.73 M^2
EST. GFR  (NON AFRICAN AMERICAN): >60 ML/MIN/1.73 M^2
EST. GFR  (NON AFRICAN AMERICAN): >60 ML/MIN/1.73 M^2
ETHANOL SERPL-MCNC: <10 MG/DL
GLUCOSE SERPL-MCNC: 100 MG/DL (ref 70–110)
GLUCOSE SERPL-MCNC: 89 MG/DL (ref 70–110)
GLUCOSE UR QL STRIP: NEGATIVE
GLUCOSE UR QL STRIP: NEGATIVE
HCT VFR BLD AUTO: 37.1 % (ref 40–54)
HCT VFR BLD AUTO: 39.4 % (ref 40–54)
HGB BLD-MCNC: 12.7 G/DL (ref 14–18)
HGB BLD-MCNC: 13.6 G/DL (ref 14–18)
HGB UR QL STRIP: ABNORMAL
HGB UR QL STRIP: ABNORMAL
IMM GRANULOCYTES # BLD AUTO: 0.01 K/UL (ref 0–0.04)
IMM GRANULOCYTES # BLD AUTO: 0.01 K/UL (ref 0–0.04)
IMM GRANULOCYTES NFR BLD AUTO: 0.2 % (ref 0–0.5)
IMM GRANULOCYTES NFR BLD AUTO: 0.3 % (ref 0–0.5)
KETONES UR QL STRIP: ABNORMAL
KETONES UR QL STRIP: NEGATIVE
LACTATE SERPL-SCNC: 0.7 MMOL/L (ref 0.5–2.2)
LEUKOCYTE ESTERASE UR QL STRIP: NEGATIVE
LEUKOCYTE ESTERASE UR QL STRIP: NEGATIVE
LYMPHOCYTES # BLD AUTO: 0.6 K/UL (ref 1–4.8)
LYMPHOCYTES # BLD AUTO: 0.8 K/UL (ref 1–4.8)
LYMPHOCYTES NFR BLD: 16.4 % (ref 18–48)
LYMPHOCYTES NFR BLD: 18.3 % (ref 18–48)
MCH RBC QN AUTO: 32.2 PG (ref 27–31)
MCH RBC QN AUTO: 32.4 PG (ref 27–31)
MCHC RBC AUTO-ENTMCNC: 34.2 G/DL (ref 32–36)
MCHC RBC AUTO-ENTMCNC: 34.5 G/DL (ref 32–36)
MCV RBC AUTO: 94 FL (ref 82–98)
MCV RBC AUTO: 94 FL (ref 82–98)
METHADONE UR QL SCN>300 NG/ML: NEGATIVE
MICROSCOPIC COMMENT: ABNORMAL
MICROSCOPIC COMMENT: ABNORMAL
MONOCYTES # BLD AUTO: 0.4 K/UL (ref 0.3–1)
MONOCYTES # BLD AUTO: 0.4 K/UL (ref 0.3–1)
MONOCYTES NFR BLD: 8.2 % (ref 4–15)
MONOCYTES NFR BLD: 9.4 % (ref 4–15)
NEUTROPHILS # BLD AUTO: 2.8 K/UL (ref 1.8–7.7)
NEUTROPHILS # BLD AUTO: 3.2 K/UL (ref 1.8–7.7)
NEUTROPHILS NFR BLD: 72 % (ref 38–73)
NEUTROPHILS NFR BLD: 72.4 % (ref 38–73)
NITRITE UR QL STRIP: NEGATIVE
NITRITE UR QL STRIP: NEGATIVE
NRBC BLD-RTO: 0 /100 WBC
NRBC BLD-RTO: 0 /100 WBC
OPIATES UR QL SCN: NEGATIVE
PCP UR QL SCN>25 NG/ML: NEGATIVE
PH UR STRIP: 6 [PH] (ref 5–8)
PH UR STRIP: 8 [PH] (ref 5–8)
PLATELET # BLD AUTO: 57 K/UL (ref 150–450)
PLATELET # BLD AUTO: 72 K/UL (ref 150–450)
PLATELET BLD QL SMEAR: ABNORMAL
PMV BLD AUTO: 10.2 FL (ref 9.2–12.9)
PMV BLD AUTO: 10.2 FL (ref 9.2–12.9)
POTASSIUM SERPL-SCNC: 3.3 MMOL/L (ref 3.5–5.1)
POTASSIUM SERPL-SCNC: 4.1 MMOL/L (ref 3.5–5.1)
PROT SERPL-MCNC: 7.1 G/DL (ref 6–8.4)
PROT UR QL STRIP: ABNORMAL
PROT UR QL STRIP: NEGATIVE
RBC # BLD AUTO: 3.94 M/UL (ref 4.6–6.2)
RBC # BLD AUTO: 4.2 M/UL (ref 4.6–6.2)
RBC #/AREA URNS HPF: 53 /HPF (ref 0–4)
RBC #/AREA URNS HPF: 80 /HPF (ref 0–4)
SODIUM SERPL-SCNC: 141 MMOL/L (ref 136–145)
SODIUM SERPL-SCNC: 141 MMOL/L (ref 136–145)
SP GR UR STRIP: 1.01 (ref 1–1.03)
SP GR UR STRIP: 1.02 (ref 1–1.03)
TOXICOLOGY INFORMATION: ABNORMAL
URN SPEC COLLECT METH UR: ABNORMAL
URN SPEC COLLECT METH UR: ABNORMAL
UROBILINOGEN UR STRIP-ACNC: NEGATIVE EU/DL
UROBILINOGEN UR STRIP-ACNC: NEGATIVE EU/DL
WBC # BLD AUTO: 3.84 K/UL (ref 3.9–12.7)
WBC # BLD AUTO: 4.49 K/UL (ref 3.9–12.7)
WBC #/AREA URNS HPF: 6 /HPF (ref 0–5)
YEAST URNS QL MICRO: ABNORMAL

## 2022-05-31 PROCEDURE — 85025 COMPLETE CBC W/AUTO DIFF WBC: CPT | Performed by: INTERNAL MEDICINE

## 2022-05-31 PROCEDURE — 80053 COMPREHEN METABOLIC PANEL: CPT | Performed by: NURSE PRACTITIONER

## 2022-05-31 PROCEDURE — 80048 BASIC METABOLIC PNL TOTAL CA: CPT | Mod: XB | Performed by: INTERNAL MEDICINE

## 2022-05-31 PROCEDURE — 82550 ASSAY OF CK (CPK): CPT | Performed by: INTERNAL MEDICINE

## 2022-05-31 PROCEDURE — 63600175 PHARM REV CODE 636 W HCPCS: Performed by: INTERNAL MEDICINE

## 2022-05-31 PROCEDURE — 80307 DRUG TEST PRSMV CHEM ANLYZR: CPT | Performed by: NURSE PRACTITIONER

## 2022-05-31 PROCEDURE — 82550 ASSAY OF CK (CPK): CPT | Mod: 91 | Performed by: EMERGENCY MEDICINE

## 2022-05-31 PROCEDURE — 25000003 PHARM REV CODE 250: Performed by: INTERNAL MEDICINE

## 2022-05-31 PROCEDURE — 81000 URINALYSIS NONAUTO W/SCOPE: CPT | Mod: 91,59 | Performed by: NURSE PRACTITIONER

## 2022-05-31 PROCEDURE — 36415 COLL VENOUS BLD VENIPUNCTURE: CPT | Performed by: INTERNAL MEDICINE

## 2022-05-31 PROCEDURE — 94761 N-INVAS EAR/PLS OXIMETRY MLT: CPT

## 2022-05-31 PROCEDURE — 85025 COMPLETE CBC W/AUTO DIFF WBC: CPT | Mod: 91 | Performed by: NURSE PRACTITIONER

## 2022-05-31 PROCEDURE — 82077 ASSAY SPEC XCP UR&BREATH IA: CPT | Performed by: EMERGENCY MEDICINE

## 2022-05-31 PROCEDURE — 81000 URINALYSIS NONAUTO W/SCOPE: CPT | Performed by: INTERNAL MEDICINE

## 2022-05-31 PROCEDURE — 99285 EMERGENCY DEPT VISIT HI MDM: CPT | Mod: 25

## 2022-05-31 PROCEDURE — 83605 ASSAY OF LACTIC ACID: CPT | Performed by: EMERGENCY MEDICINE

## 2022-05-31 RX ORDER — DOXYCYCLINE HYCLATE 100 MG
100 TABLET ORAL EVERY 12 HOURS
Qty: 14 TABLET | Refills: 0 | Status: ON HOLD | OUTPATIENT
Start: 2022-05-31 | End: 2022-06-01

## 2022-05-31 RX ORDER — AMOXICILLIN AND CLAVULANATE POTASSIUM 875; 125 MG/1; MG/1
1 TABLET, FILM COATED ORAL EVERY 12 HOURS
Qty: 14 TABLET | Refills: 0 | Status: ON HOLD | OUTPATIENT
Start: 2022-05-31 | End: 2022-06-01 | Stop reason: ALTCHOICE

## 2022-05-31 RX ORDER — ACETAMINOPHEN 500 MG
1000 TABLET ORAL
Status: COMPLETED | OUTPATIENT
Start: 2022-05-31 | End: 2022-06-01

## 2022-05-31 RX ORDER — KETOROLAC TROMETHAMINE 30 MG/ML
15 INJECTION, SOLUTION INTRAMUSCULAR; INTRAVENOUS
Status: COMPLETED | OUTPATIENT
Start: 2022-05-31 | End: 2022-06-01

## 2022-05-31 RX ORDER — AMOXICILLIN AND CLAVULANATE POTASSIUM 875; 125 MG/1; MG/1
1 TABLET, FILM COATED ORAL EVERY 12 HOURS
Qty: 14 TABLET | Refills: 0 | Status: SHIPPED | OUTPATIENT
Start: 2022-05-31 | End: 2022-05-31 | Stop reason: SDUPTHER

## 2022-05-31 RX ORDER — DOXYCYCLINE HYCLATE 100 MG
100 TABLET ORAL
Status: COMPLETED | OUTPATIENT
Start: 2022-05-31 | End: 2022-06-01

## 2022-05-31 RX ORDER — AMOXICILLIN AND CLAVULANATE POTASSIUM 875; 125 MG/1; MG/1
1 TABLET, FILM COATED ORAL
Status: COMPLETED | OUTPATIENT
Start: 2022-05-31 | End: 2022-06-01

## 2022-05-31 RX ADMIN — DOXYCYCLINE HYCLATE 100 MG: 100 TABLET, COATED ORAL at 09:05

## 2022-05-31 RX ADMIN — MULTIVITAMIN 15 ML: LIQUID ORAL at 09:05

## 2022-05-31 RX ADMIN — DOCUSATE SODIUM AND SENNOSIDES 2 TABLET: 8.6; 5 TABLET, FILM COATED ORAL at 09:05

## 2022-05-31 RX ADMIN — ACETAMINOPHEN 650 MG: 325 TABLET ORAL at 04:05

## 2022-05-31 RX ADMIN — CEFTRIAXONE 2 G: 2 INJECTION, SOLUTION INTRAVENOUS at 04:05

## 2022-05-31 RX ADMIN — FAMOTIDINE 20 MG: 20 TABLET ORAL at 09:05

## 2022-05-31 RX ADMIN — MUPIROCIN: 20 OINTMENT TOPICAL at 09:05

## 2022-05-31 RX ADMIN — ENOXAPARIN SODIUM 40 MG: 40 INJECTION SUBCUTANEOUS at 09:05

## 2022-05-31 NOTE — PLAN OF CARE
[facility] - Telemetry (Hospital)  Discharge Final Note    Primary Care Provider: Primary Doctor No    Expected Discharge Date: 5/31/2022    Final Discharge Note (most recent)     Final Note - 05/31/22 1651        Final Note    Assessment Type Final Discharge Note     Anticipated Discharge Disposition Home or Self Care        Post-Acute Status    Discharge Delays None known at this time                 Important Message from Medicare             Contact Info     [facility]    Main Admissions   [phone]       Next Steps: Go to    Instructions: Contact number regarding resources and inpatient admission for substance abuse treatment.    No, Primary Doctor   Relationship: PCP - General        Next Steps: Follow up in 1 week(s)        Patient is very unsure of where is permanent residence is going to be.  Phone number to [facility] and M given to the patient.  Advised he needed to f/u with a PCP within 7 days.  Patient currently has no insurance here in [place].  He has [place] medicaid but does not plan to return there.

## 2022-05-31 NOTE — PLAN OF CARE
Pt is  AAO X4 . He denies any SI/VH/AH . He is cooperative , following appropriate commands  In NAD .  No family members at  Bedside . SW consulted to help with  Transportation . Pt provided his father home  address  .
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
## 2022-05-31 NOTE — PLAN OF CARE
Pt remains fall free this shift.  Pt AAOx4, verbal, clear speech.  Skin warm and dry. No new skin issues.  Telemonitoring in progress, (ST low 100s)  Room air  Urinal  Standby assist with transfers.  IV abx   Q4 neuro checks   ICU transfer   Bed low, side rails up x 2, wheels locked, call light in reach.  Bed alarm maintained for safety.  Patient instructed to call for assistance.  Hourly rounding completed.  24 hour chart check completed  POC updated and reviewed with pt. Will continue POC.

## 2022-06-01 PROBLEM — F10.10 ETOH ABUSE: Status: ACTIVE | Noted: 2022-06-01

## 2022-06-01 PROBLEM — G93.40 ENCEPHALOPATHY: Status: ACTIVE | Noted: 2022-06-01

## 2022-06-01 LAB — SARS-COV-2 RDRP RESP QL NAA+PROBE: NEGATIVE

## 2022-06-01 PROCEDURE — 63600175 PHARM REV CODE 636 W HCPCS: Performed by: EMERGENCY MEDICINE

## 2022-06-01 PROCEDURE — 96361 HYDRATE IV INFUSION ADD-ON: CPT

## 2022-06-01 PROCEDURE — U0002 COVID-19 LAB TEST NON-CDC: HCPCS | Performed by: EMERGENCY MEDICINE

## 2022-06-01 PROCEDURE — 25000003 PHARM REV CODE 250: Performed by: NURSE PRACTITIONER

## 2022-06-01 PROCEDURE — 99222 PR INITIAL HOSPITAL CARE,LEVL II: ICD-10-PCS | Mod: ,,, | Performed by: PSYCHIATRY & NEUROLOGY

## 2022-06-01 PROCEDURE — 21400001 HC TELEMETRY ROOM

## 2022-06-01 PROCEDURE — 25000003 PHARM REV CODE 250: Performed by: EMERGENCY MEDICINE

## 2022-06-01 PROCEDURE — 99222 1ST HOSP IP/OBS MODERATE 55: CPT | Mod: ,,, | Performed by: PSYCHIATRY & NEUROLOGY

## 2022-06-01 PROCEDURE — 11000001 HC ACUTE MED/SURG PRIVATE ROOM

## 2022-06-01 PROCEDURE — 63600175 PHARM REV CODE 636 W HCPCS: Performed by: NURSE PRACTITIONER

## 2022-06-01 PROCEDURE — 96375 TX/PRO/DX INJ NEW DRUG ADDON: CPT

## 2022-06-01 PROCEDURE — 96374 THER/PROPH/DIAG INJ IV PUSH: CPT

## 2022-06-01 PROCEDURE — 25000003 PHARM REV CODE 250: Performed by: INTERNAL MEDICINE

## 2022-06-01 RX ORDER — AMOXICILLIN AND CLAVULANATE POTASSIUM 875; 125 MG/1; MG/1
1 TABLET, FILM COATED ORAL EVERY 12 HOURS
Status: DISCONTINUED | OUTPATIENT
Start: 2022-06-01 | End: 2022-06-02 | Stop reason: HOSPADM

## 2022-06-01 RX ORDER — ACETAMINOPHEN 325 MG/1
650 TABLET ORAL EVERY 6 HOURS PRN
Status: DISCONTINUED | OUTPATIENT
Start: 2022-06-01 | End: 2022-06-02 | Stop reason: HOSPADM

## 2022-06-01 RX ORDER — SODIUM CHLORIDE 9 MG/ML
INJECTION, SOLUTION INTRAVENOUS
Status: DISCONTINUED | OUTPATIENT
Start: 2022-06-01 | End: 2022-06-02 | Stop reason: HOSPADM

## 2022-06-01 RX ORDER — GUAIFENESIN 100 MG/5ML
200 SOLUTION ORAL EVERY 4 HOURS PRN
Status: DISCONTINUED | OUTPATIENT
Start: 2022-06-01 | End: 2022-06-02 | Stop reason: HOSPADM

## 2022-06-01 RX ORDER — TALC
6 POWDER (GRAM) TOPICAL NIGHTLY PRN
Status: DISCONTINUED | OUTPATIENT
Start: 2022-06-01 | End: 2022-06-02 | Stop reason: HOSPADM

## 2022-06-01 RX ORDER — LOSARTAN POTASSIUM 50 MG/1
50 TABLET ORAL DAILY
Status: ON HOLD | COMMUNITY
Start: 2022-03-01 | End: 2022-06-02 | Stop reason: SDUPTHER

## 2022-06-01 RX ORDER — LORAZEPAM 2 MG/ML
2 INJECTION INTRAMUSCULAR
Status: COMPLETED | OUTPATIENT
Start: 2022-06-01 | End: 2022-06-01

## 2022-06-01 RX ORDER — LOSARTAN POTASSIUM 50 MG/1
50 TABLET ORAL DAILY
Status: DISCONTINUED | OUTPATIENT
Start: 2022-06-01 | End: 2022-06-02 | Stop reason: HOSPADM

## 2022-06-01 RX ORDER — DOXYCYCLINE HYCLATE 100 MG
100 TABLET ORAL EVERY 12 HOURS
Status: DISCONTINUED | OUTPATIENT
Start: 2022-06-01 | End: 2022-06-02 | Stop reason: HOSPADM

## 2022-06-01 RX ORDER — THIAMINE HCL 100 MG
100 TABLET ORAL DAILY
Status: DISCONTINUED | OUTPATIENT
Start: 2022-06-01 | End: 2022-06-01

## 2022-06-01 RX ORDER — HYDRALAZINE HYDROCHLORIDE 20 MG/ML
10 INJECTION INTRAMUSCULAR; INTRAVENOUS EVERY 6 HOURS PRN
Status: DISCONTINUED | OUTPATIENT
Start: 2022-06-01 | End: 2022-06-02 | Stop reason: HOSPADM

## 2022-06-01 RX ORDER — SODIUM CHLORIDE 0.9 % (FLUSH) 0.9 %
10 SYRINGE (ML) INJECTION
Status: DISCONTINUED | OUTPATIENT
Start: 2022-06-01 | End: 2022-06-02 | Stop reason: HOSPADM

## 2022-06-01 RX ADMIN — THIAMINE HYDROCHLORIDE 500 MG: 100 INJECTION, SOLUTION INTRAMUSCULAR; INTRAVENOUS at 02:06

## 2022-06-01 RX ADMIN — KETOROLAC TROMETHAMINE 15 MG: 30 INJECTION, SOLUTION INTRAMUSCULAR at 12:06

## 2022-06-01 RX ADMIN — Medication 6 MG: at 09:06

## 2022-06-01 RX ADMIN — GUAIFENESIN 200 MG: 200 SOLUTION ORAL at 10:06

## 2022-06-01 RX ADMIN — DOXYCYCLINE HYCLATE 100 MG: 100 TABLET, COATED ORAL at 12:06

## 2022-06-01 RX ADMIN — HYDRALAZINE HYDROCHLORIDE 10 MG: 20 INJECTION, SOLUTION INTRAMUSCULAR; INTRAVENOUS at 09:06

## 2022-06-01 RX ADMIN — AMOXICILLIN AND CLAVULANATE POTASSIUM 1 TABLET: 875; 125 TABLET, FILM COATED ORAL at 12:06

## 2022-06-01 RX ADMIN — SODIUM CHLORIDE: 0.9 INJECTION, SOLUTION INTRAVENOUS at 02:06

## 2022-06-01 RX ADMIN — THIAMINE HCL TAB 100 MG 100 MG: 100 TAB at 12:06

## 2022-06-01 RX ADMIN — ACETAMINOPHEN 650 MG: 325 TABLET ORAL at 10:06

## 2022-06-01 RX ADMIN — THERA TABS 1 TABLET: TAB at 12:06

## 2022-06-01 RX ADMIN — SODIUM CHLORIDE 1000 ML: 0.9 INJECTION, SOLUTION INTRAVENOUS at 12:06

## 2022-06-01 RX ADMIN — ACETAMINOPHEN 1000 MG: 500 TABLET ORAL at 12:06

## 2022-06-01 RX ADMIN — LORAZEPAM 2 MG: 2 INJECTION INTRAMUSCULAR; INTRAVENOUS at 02:06

## 2022-06-01 RX ADMIN — LOSARTAN POTASSIUM 50 MG: 50 TABLET, FILM COATED ORAL at 12:06

## 2022-06-01 RX ADMIN — AMOXICILLIN AND CLAVULANATE POTASSIUM 1 TABLET: 875; 125 TABLET, FILM COATED ORAL at 09:06

## 2022-06-01 RX ADMIN — HYDRALAZINE HYDROCHLORIDE 10 MG: 20 INJECTION, SOLUTION INTRAMUSCULAR; INTRAVENOUS at 05:06

## 2022-06-01 RX ADMIN — DOXYCYCLINE HYCLATE 100 MG: 100 TABLET, COATED ORAL at 09:06
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
## 2022-06-01 NOTE — ASSESSMENT & PLAN NOTE
Continue doxycycline and augmentin  Will need a new prescription once discharge and bedside delivery  On room air

## 2022-06-01 NOTE — FIRST PROVIDER EVALUATION
Medical screening exam completed.  I have conducted a focused provider triage encounter, findings are as follows:    Brief history of present illness:  Pt discharged from the hospital today after being admitted for severe sepsis.  Pt reporting confusion and bloody urine.    Vitals:    05/31/22 2013   BP: (!) 176/94   Pulse: 80   Resp: 20   Temp: 98.7 °F (37.1 °C)   TempSrc: Oral   SpO2: (!) 94%   Weight: (!) 139.7 kg (307 lb 15.7 oz)   Height: 6' (1.829 m)       Pertinent physical exam:      Brief workup plan:      Preliminary workup initiated; this workup will be continued and followed by the physician or advanced practice provider that is assigned to the patient when roomed.

## 2022-06-01 NOTE — HPI
Cl Saunders is a 41 year old male who presented to ED by father for further evaluation of confusion and forgetfulness. He was discharged from the hospital after being treated for drug overdose, acute on chronic respiratory failure related to aspiration pneumonia, and alcohol intoxication. Patient was discharged in stable condition, but returned last night due to periods of confusion and memory loss. Patient can answer questions appropriately, but will often repeat some questions. He denies recreational drug use or ETOH since discharge. He does remember much from his hospital stay. Can follow all commands.    In ED, work up unremarkable other then some forgetfulness. MRI Brain negative for acute process but shows chronic infarct. He was evaluated by neurology in ED who recommended a formal neurology consult.     He was not able to  antibiotics from pharmacy.
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
## 2022-06-01 NOTE — H&P
[facility]  History & Physical    Patient Name: Cl Saunders  MRN: [id number]  Patient Class: IP- Inpatient  Admission Date: 5/31/2022  Attending Physician: Wai Go MD   Primary Care Provider: Primary Doctor No      Patient information was obtained from patient and ER records.     Subjective:     Principal Problem:Encephalopathy    Chief Complaint:   Chief Complaint   Patient presents with    Hematuria     Pt reports to the ed with reports of blood in urine and confusion. Pt family states that pt called them to pick him up today after being discharged from hospital.         HPI: Cl Saunders is a 41 year old male who presented to ED by father for further evaluation of confusion and forgetfulness. He was discharged from the hospital after being treated for drug overdose, acute on chronic respiratory failure related to aspiration pneumonia, and alcohol intoxication. Patient was discharged in stable condition, but returned last night due to periods of confusion and memory loss. Patient can answer questions appropriately, but will often repeat some questions. He denies recreational drug use or ETOH since discharge. He does remember much from his hospital stay. Can follow all commands.    In ED, work up unremarkable other then some forgetfulness. MRI Brain negative for acute process but shows chronic infarct. He was evaluated by neurology in ED who recommended a formal neurology consult.     He was not able to  antibiotics from pharmacy.       Past Medical History:   Diagnosis Date    H/O ETOH abuse     Hypertension        Past Surgical History:   Procedure Laterality Date    ADHD      TONSILLECTOMY         Review of patient's allergies indicates:  No Known Allergies    Current Facility-Administered Medications on File Prior to Encounter   Medication    [DISCONTINUED] acetaminophen tablet 650 mg    [DISCONTINUED] albuterol-ipratropium 2.5 mg-0.5 mg/3 mL nebulizer  solution 3 mL    [DISCONTINUED] cefTRIAXone (ROCEPHIN) 2 g/50 mL D5W IVPB    [DISCONTINUED] doxycycline tablet 100 mg    [DISCONTINUED] enoxaparin injection 40 mg    [DISCONTINUED] famotidine tablet 20 mg    [DISCONTINUED] lactated ringers bolus 1,000 mL    [DISCONTINUED] lorazepam injection 4 mg    [DISCONTINUED] multivit-min-ferrous gluconate 9 mg iron/15 mL oral liquid 15 mL    [DISCONTINUED] mupirocin 2 % ointment    [DISCONTINUED] ondansetron injection 4 mg    [DISCONTINUED] senna-docusate 8.6-50 mg per tablet 2 tablet     Current Outpatient Medications on File Prior to Encounter   Medication Sig    losartan (COZAAR) 50 MG tablet Take 50 mg by mouth once daily.    [DISCONTINUED] LOSARTAN POTASSIUM (LOSARTAN ORAL) Take by mouth once daily.     [DISCONTINUED] albuterol 90 mcg/actuation inhaler Inhale 2 puffs into the lungs every 6 (six) hours as needed for Wheezing.    [DISCONTINUED] amlodipine (NORVASC) 5 MG tablet Take 1 tablet (5 mg total) by mouth once daily.    [DISCONTINUED] amoxicillin-clavulanate 875-125mg (AUGMENTIN) 875-125 mg per tablet Take 1 tablet by mouth every 12 (twelve) hours. for 7 days    [DISCONTINUED] dextroamphetamine-amphetamine 30 mg Tab Take by mouth 2 (two) times daily.    [DISCONTINUED] diclofenac (VOLTAREN) 50 MG EC tablet Take 1 tablet (50 mg total) by mouth 3 (three) times daily as needed.    [DISCONTINUED] doxycycline (VIBRA-TABS) 100 MG tablet Take 1 tablet (100 mg total) by mouth every 12 (twelve) hours. for 7 days    [DISCONTINUED] nabumetone (RELAFEN) 750 MG tablet Take 1 tablet (750 mg total) by mouth 2 (two) times daily as needed for Pain.    [DISCONTINUED] ondansetron (ZOFRAN-ODT) 4 MG TbDL Take 1 tablet (4 mg total) by mouth every 6 (six) hours as needed.    [DISCONTINUED] zolpidem (AMBIEN) 10 mg Tab Take 10 mg by mouth nightly as needed.     Family History       Problem Relation (Age of Onset)    Hypertension Father, Maternal Grandmother, Maternal  "Grandfather          Tobacco Use    Smoking status: Never Smoker    Smokeless tobacco: Current User     Types: Chew   Substance and Sexual Activity    Alcohol use: Yes     Alcohol/week: 14.0 standard drinks     Types: 14 Glasses of wine per week    Drug use: Yes     Types: Marijuana     Comment: "sometimes marijuana"    Sexual activity: Not on file     Review of Systems   Unable to perform ROS: Mental status change  confusion.      Objective:     Vital Signs (Most Recent):  Temp: 97.9 °F (36.6 °C) (06/01/22 1629)  Pulse: 72 (06/01/22 1629)  Resp: 18 (06/01/22 1629)  BP: (!) 183/94 (06/01/22 1629)  SpO2: 96 % (06/01/22 1629)   Vital Signs (24h Range):  Temp:  [97.9 °F (36.6 °C)-98.7 °F (37.1 °C)] 97.9 °F (36.6 °C)  Pulse:  [65-81] 72  Resp:  [18-20] 18  SpO2:  [94 %-99 %] 96 %  BP: (110-196)/() 183/94     Weight: (!) 139.7 kg (307 lb 15.7 oz)  Body mass index is 41.77 kg/m².    Physical Exam  Vitals and nursing note reviewed.   Constitutional:       General: He is not in acute distress.     Appearance: He is well-developed.   HENT:      Head: Normocephalic and atraumatic.   Cardiovascular:      Rate and Rhythm: Normal rate and regular rhythm.      Heart sounds: Normal heart sounds.   Pulmonary:      Effort: Pulmonary effort is normal. No respiratory distress.      Breath sounds: Normal breath sounds.   Abdominal:      General: Bowel sounds are normal. There is no distension.      Palpations: Abdomen is soft.      Tenderness: There is no abdominal tenderness.   Musculoskeletal:         General: Normal range of motion.      Cervical back: Normal range of motion and neck supple. No edema.   Skin:     General: Skin is dry.   Neurological:      Mental Status: He is alert.      Comments: Oriented to person and place.  Forgetfulness.   Answer questions appropriately             Significant Labs: All pertinent labs within the past 24 hours have been reviewed.  CBC:   Recent Labs   Lab 05/31/22  0704 05/31/22 2039 "   WBC 3.84* 4.49   HGB 12.7* 13.6*   HCT 37.1* 39.4*   PLT 57* 72*     CMP:   Recent Labs   Lab 05/31/22 0704 05/31/22 2039    141   K 3.3* 4.1    103   CO2 24 25   GLU 89 100   BUN 11 9   CREATININE 0.9 0.9   CALCIUM 7.9* 8.9   PROT  --  7.1   ALBUMIN  --  3.8   BILITOT  --  1.3*   ALKPHOS  --  67   AST  --  40   ALT  --  44   ANIONGAP 11 13   EGFRNONAA >60 >60       Significant Imaging:   Imaging Results              MRI Brain Without Contrast (Final result)  Result time 06/01/22 08:27:24      Final result by Sanjay Cartwright Jr., MD (06/01/22 08:27:24)                   Impression:      1. No acute infarction or acute intracranial hemorrhage.  2. Chronic lacunar infarct within the left thalamus with tiny chronic infarcts within the superior cerebellar hemispheres.      Electronically signed by: Sanjay Cartwright Jr., MD  Date:    06/01/2022  Time:    08:27               Narrative:    EXAMINATION:  MRI BRAIN WITHOUT CONTRAST    CLINICAL HISTORY:  Mental status change, unknown cause;    TECHNIQUE:  Sagittal T1. Axial T1, T2, T2 FLAIR, GRE, DWI. Coronal T2 FLAIR.    COMPARISON:  Prior CT of the head from 05/29/2022.    FINDINGS:  The examination is slightly limited by motion artifact.  There is no restricted diffusion. Chronic lacunar infarct within the left thalamus.  Tiny, chronic infarcts within the cerebellar hemispheres.  The remainder of the brain is normal in morphology and signal.    The ventricles and sulci are normal in size and configuration. Midline structures are normal. There are preserved arterial flow-voids on T2 weighted imaging. The paranasal sinuses and mastoid air cells are clear.    No abnormal marrow signal is identified.                                       CT Renal Stone Study ABD Pelvis WO (Final result)  Result time 05/31/22 22:04:30      Final result by Chinmay Hung MD (05/31/22 22:04:30)                   Impression:      No definite acute abnormality.    Moderate  splenomegaly stable from the prior.  This is of unclear etiology.    Additional details as above.  Clinical correlation and further evaluation as warranted.    All CT scans at this facility are performed  using dose modulation techniques as appropriate to performed exam including the following:  automated exposure control; adjustment of mA and/or kV according to the patients size (this includes techniques or standardized protocols for targeted exams where dose is matched to indication/reason for exam: i.e. extremities or head);  iterative reconstruction technique.      Electronically signed by: Chinmay Hung  Date:    05/31/2022  Time:    22:04               Narrative:    EXAMINATION:  CT RENAL STONE STUDY ABD PELVIS WO    CLINICAL HISTORY:  Flank pain, kidney stone suspected;    TECHNIQUE:  Low dose axial images, sagittal and coronal reformations were obtained from the lung bases to the pubic symphysis.  Contrast was not administered.    COMPARISON:  05/29/2022    FINDINGS:  Heart: Normal in size. No pericardial effusion.    Lung Bases: Improvement in bibasilar opacities in comparison to the prior.    Liver: Normal in size and attenuation, with no focal hepatic lesions.    Gallbladder: Surgically absent.    Bile Ducts: No evidence of dilated ducts.    Pancreas: No mass or peripancreatic fat stranding.    Spleen: Prior splenic granulomatous disease.  Spleen is moderately enlarged.    Adrenals: Unremarkable.    Kidneys/ Ureters: Nonobstructive left nephrolithiasis.  No hydronephrosis or obstructive uropathy.    Bladder: No evidence of wall thickening.    Reproductive organs: Unremarkable.    GI Tract/Mesentery: No evidence of bowel obstruction or inflammation.  No evidence of appendicitis.    Peritoneal Space: No ascites. No free air.    Retroperitoneum: No significant adenopathy.    Abdominal wall: Unremarkable.    Vasculature: No significant atherosclerosis or aneurysm.    Bones: No acute fracture.                                        CT Head Without Contrast (Final result)  Result time 05/31/22 21:59:23      Final result by Chinmay Hung MD (05/31/22 21:59:23)                   Impression:      No acute intracranial CT abnormality.    All CT scans at this facility are performed  using dose modulation techniques as appropriate to performed exam including the following:  automated exposure control; adjustment of mA and/or kV according to the patients size (this includes techniques or standardized protocols for targeted exams where dose is matched to indication/reason for exam: i.e. extremities or head);  iterative reconstruction technique.      Electronically signed by: Chinmay Hung  Date:    05/31/2022  Time:    21:59               Narrative:    EXAMINATION:  CT HEAD WITHOUT CONTRAST    CLINICAL HISTORY:  Mental status change, unknown cause;    TECHNIQUE:  Low dose axial CT images obtained throughout the head without intravenous contrast. Sagittal and coronal reconstructions were performed.    COMPARISON:  05/29/2022    FINDINGS:  Intracranial compartment:    Ventricles and sulci are normal in size for age without evidence of hydrocephalus. No extra-axial blood or fluid collections.    The brain parenchyma appears normal. No parenchymal mass, hemorrhage, edema or major vascular distribution infarct.    Skull/extracranial contents (limited evaluation): No fracture. Mastoid air cells and paranasal sinuses are essentially clear.                                        Assessment/Plan:     * Encephalopathy  Some confusion with ?amnesia. MRI shows old acute infarcts. Could this be related to wernicke's encephalopathy  Thiamine 500mg IV x 2 days,then transition to PO  MVI  Neuro checks  Tele neurology consult      ETOH abuse  --libirum  Ativan prn  Thiamine, MVI  Outpatient resources for rehab given      Morbid obesity with BMI of 40.0-44.9, adult  Body mass index is 41.77 kg/m². Morbid obesity complicates all aspects of  disease management from diagnostic modalities to treatment. Weight loss encouraged and health benefits explained to patient.       Aspiration pneumonia  Continue doxycycline and augmentin  Will need a new prescription once discharge and bedside delivery  On room air        VTE Risk Mitigation (From admission, onward)         Ordered     IP VTE HIGH RISK PATIENT  Once         06/01/22 1057     Place sequential compression device  Until discontinued         06/01/22 1057                   Edwige Rivera NP  Department of Hospital Medicine   [facility]

## 2022-06-01 NOTE — PHARMACY MED REC
"Admission Medication History     The home medication history was taken by Tanvir Barragan.    You may go to "Admission" then "Reconcile Home Medications" tabs to review and/or act upon these items.      The home medication list has been updated by the Pharmacy department.    Please read ALL comments highlighted in yellow.    Please address this information as you see fit.     Feel free to contact us if you have any questions or require assistance.      The medications listed below were removed from the home medication list. Please reorder if appropriate:  Patient reports no longer taking the following medication(s):   ALBUTEROL HFA 90 MCG/ACTUATION INHALER   AMLODIPINE 5 MG TABLET   AMOXICILLIN-CLAVULANATE 875-125 MG PER TABLET   DEXTROAMPHETAMINE-AMPHETAMINE 30 MG TABLET   DICLOFENAC 50 MG EC TABLET   DOXYCYCLINE HYCLATE 100 MG TABLET   NABUMETONE 750 MG TABLET   ONDANSETRON ODT 4 MG TABLET   ZOLPIDEM 10 MG TABLET    Medications listed below were obtained from: Patient/family, [employer] [facility] and Medical records  PTA Medications   Medication Sig    losartan (COZAAR) 50 MG tablet Take 50 mg by mouth once daily.       Potential issues to be addressed PRIOR TO DISCHARGE  Please discuss with the patient barriers to adherence with medication treatment plans  Patient requires education regarding drug therapies     Tanvir Barragan, [facility]  Spectraljnq [phone]  Feel free to secure chat me.      Current Outpatient Medications on File Prior to Encounter   Medication Sig Dispense Refill Last Dose    losartan (COZAAR) 50 MG tablet Take 50 mg by mouth once daily.   Past Week at 05/27/2022    [DISCONTINUED] LOSARTAN POTASSIUM (LOSARTAN ORAL) Take by mouth once daily.    Past Week at Unknown time    [DISCONTINUED] albuterol 90 mcg/actuation inhaler Inhale 2 puffs into the lungs every 6 (six) hours as needed for Wheezing. 18 g 11     [DISCONTINUED] amlodipine (NORVASC) 5 MG tablet Take 1 tablet (5 mg " total) by mouth once daily. 30 tablet 1     [DISCONTINUED] amoxicillin-clavulanate 875-125mg (AUGMENTIN) 875-125 mg per tablet Take 1 tablet by mouth every 12 (twelve) hours. for 7 days 14 tablet 0     [DISCONTINUED] dextroamphetamine-amphetamine 30 mg Tab Take by mouth 2 (two) times daily.       [DISCONTINUED] diclofenac (VOLTAREN) 50 MG EC tablet Take 1 tablet (50 mg total) by mouth 3 (three) times daily as needed. 20 tablet 0     [DISCONTINUED] doxycycline (VIBRA-TABS) 100 MG tablet Take 1 tablet (100 mg total) by mouth every 12 (twelve) hours. for 7 days 14 tablet 0     [DISCONTINUED] nabumetone (RELAFEN) 750 MG tablet Take 1 tablet (750 mg total) by mouth 2 (two) times daily as needed for Pain. 60 tablet 0     [DISCONTINUED] ondansetron (ZOFRAN-ODT) 4 MG TbDL Take 1 tablet (4 mg total) by mouth every 6 (six) hours as needed. 15 tablet 0 More than a month at Unknown time    [DISCONTINUED] zolpidem (AMBIEN) 10 mg Tab Take 10 mg by mouth nightly as needed.  2                            .

## 2022-06-01 NOTE — ASSESSMENT & PLAN NOTE
Body mass index is 41.77 kg/m². Morbid obesity complicates all aspects of disease management from diagnostic modalities to treatment. Weight loss encouraged and health benefits explained to patient.

## 2022-06-01 NOTE — PLAN OF CARE
Pt remains free from falls/injuries this shift. Safety precautions maintained. Pain managed with rest. No s/s of acute distress noted. VSS. BP controlled with PRN medication. Tele monitoring per orders. Will continue to monitor. Chart check completed.

## 2022-06-01 NOTE — ASSESSMENT & PLAN NOTE
Some confusion with ?amnesia. MRI shows old acute infarcts. Could this be related to wernicke's encephalopathy  Thiamine 500mg IV x 2 days,then transition to PO  MVI  Neuro checks  Tele neurology consult

## 2022-06-02 VITALS
RESPIRATION RATE: 18 BRPM | DIASTOLIC BLOOD PRESSURE: 102 MMHG | OXYGEN SATURATION: 92 % | HEART RATE: 80 BPM | WEIGHT: 315 LBS | BODY MASS INDEX: 42.66 KG/M2 | TEMPERATURE: 98 F | HEIGHT: 72 IN | SYSTOLIC BLOOD PRESSURE: 171 MMHG

## 2022-06-02 LAB
ANION GAP SERPL CALC-SCNC: 13 MMOL/L (ref 8–16)
BACTERIA SPEC AEROBE CULT: NORMAL
BACTERIA SPEC AEROBE CULT: NORMAL
BASOPHILS # BLD AUTO: 0.02 K/UL (ref 0–0.2)
BASOPHILS NFR BLD: 0.4 % (ref 0–1.9)
BUN SERPL-MCNC: 10 MG/DL (ref 6–20)
CALCIUM SERPL-MCNC: 8.8 MG/DL (ref 8.7–10.5)
CHLORIDE SERPL-SCNC: 104 MMOL/L (ref 95–110)
CO2 SERPL-SCNC: 23 MMOL/L (ref 23–29)
CREAT SERPL-MCNC: 0.8 MG/DL (ref 0.5–1.4)
DIFFERENTIAL METHOD: ABNORMAL
EOSINOPHIL # BLD AUTO: 0.1 K/UL (ref 0–0.5)
EOSINOPHIL NFR BLD: 2.4 % (ref 0–8)
ERYTHROCYTE [DISTWIDTH] IN BLOOD BY AUTOMATED COUNT: 12.5 % (ref 11.5–14.5)
EST. GFR  (AFRICAN AMERICAN): >60 ML/MIN/1.73 M^2
EST. GFR  (NON AFRICAN AMERICAN): >60 ML/MIN/1.73 M^2
GLUCOSE SERPL-MCNC: 85 MG/DL (ref 70–110)
GRAM STN SPEC: NORMAL
GRAM STN SPEC: NORMAL
HCT VFR BLD AUTO: 41.2 % (ref 40–54)
HGB BLD-MCNC: 14.3 G/DL (ref 14–18)
IMM GRANULOCYTES # BLD AUTO: 0.01 K/UL (ref 0–0.04)
IMM GRANULOCYTES NFR BLD AUTO: 0.2 % (ref 0–0.5)
LYMPHOCYTES # BLD AUTO: 0.7 K/UL (ref 1–4.8)
LYMPHOCYTES NFR BLD: 15.3 % (ref 18–48)
MCH RBC QN AUTO: 32.2 PG (ref 27–31)
MCHC RBC AUTO-ENTMCNC: 34.7 G/DL (ref 32–36)
MCV RBC AUTO: 93 FL (ref 82–98)
MONOCYTES # BLD AUTO: 0.5 K/UL (ref 0.3–1)
MONOCYTES NFR BLD: 10 % (ref 4–15)
NEUTROPHILS # BLD AUTO: 3.2 K/UL (ref 1.8–7.7)
NEUTROPHILS NFR BLD: 71.7 % (ref 38–73)
NRBC BLD-RTO: 0 /100 WBC
PLATELET # BLD AUTO: 105 K/UL (ref 150–450)
PLATELET BLD QL SMEAR: ABNORMAL
PMV BLD AUTO: 10.5 FL (ref 9.2–12.9)
POTASSIUM SERPL-SCNC: 3.3 MMOL/L (ref 3.5–5.1)
RBC # BLD AUTO: 4.44 M/UL (ref 4.6–6.2)
SODIUM SERPL-SCNC: 140 MMOL/L (ref 136–145)
WBC # BLD AUTO: 4.5 K/UL (ref 3.9–12.7)

## 2022-06-02 PROCEDURE — 25000003 PHARM REV CODE 250: Performed by: INTERNAL MEDICINE

## 2022-06-02 PROCEDURE — 25000003 PHARM REV CODE 250: Performed by: NURSE PRACTITIONER

## 2022-06-02 PROCEDURE — 25000003 PHARM REV CODE 250: Performed by: FAMILY MEDICINE

## 2022-06-02 PROCEDURE — 80048 BASIC METABOLIC PNL TOTAL CA: CPT | Performed by: NURSE PRACTITIONER

## 2022-06-02 PROCEDURE — 85025 COMPLETE CBC W/AUTO DIFF WBC: CPT | Performed by: NURSE PRACTITIONER

## 2022-06-02 PROCEDURE — 63600175 PHARM REV CODE 636 W HCPCS: Performed by: NURSE PRACTITIONER

## 2022-06-02 PROCEDURE — 36415 COLL VENOUS BLD VENIPUNCTURE: CPT | Performed by: NURSE PRACTITIONER

## 2022-06-02 RX ORDER — LOSARTAN POTASSIUM 50 MG/1
100 TABLET ORAL DAILY
Qty: 60 TABLET | Refills: 11 | Status: SHIPPED | OUTPATIENT
Start: 2022-06-02 | End: 2023-06-02

## 2022-06-02 RX ORDER — AMOXICILLIN AND CLAVULANATE POTASSIUM 875; 125 MG/1; MG/1
1 TABLET, FILM COATED ORAL EVERY 12 HOURS
Qty: 14 TABLET | Refills: 0 | Status: SHIPPED | OUTPATIENT
Start: 2022-06-02 | End: 2022-06-09

## 2022-06-02 RX ORDER — LOSARTAN POTASSIUM 50 MG/1
50 TABLET ORAL ONCE
Status: COMPLETED | OUTPATIENT
Start: 2022-06-02 | End: 2022-06-02

## 2022-06-02 RX ORDER — CHLORDIAZEPOXIDE HYDROCHLORIDE 5 MG/1
10 CAPSULE, GELATIN COATED ORAL 3 TIMES DAILY
Status: DISCONTINUED | OUTPATIENT
Start: 2022-06-02 | End: 2022-06-02 | Stop reason: HOSPADM

## 2022-06-02 RX ORDER — LANOLIN ALCOHOL/MO/W.PET/CERES
100 CREAM (GRAM) TOPICAL DAILY
COMMUNITY
Start: 2022-06-02 | End: 2022-06-16

## 2022-06-02 RX ADMIN — CHLORDIAZEPOXIDE HYDROCHLORIDE 10 MG: 5 CAPSULE ORAL at 08:06

## 2022-06-02 RX ADMIN — ACETAMINOPHEN 650 MG: 325 TABLET ORAL at 07:06

## 2022-06-02 RX ADMIN — THERA TABS 1 TABLET: TAB at 08:06

## 2022-06-02 RX ADMIN — SODIUM CHLORIDE: 0.9 INJECTION, SOLUTION INTRAVENOUS at 09:06

## 2022-06-02 RX ADMIN — THIAMINE HYDROCHLORIDE 500 MG: 100 INJECTION, SOLUTION INTRAMUSCULAR; INTRAVENOUS at 09:06

## 2022-06-02 RX ADMIN — AMOXICILLIN AND CLAVULANATE POTASSIUM 1 TABLET: 875; 125 TABLET, FILM COATED ORAL at 08:06

## 2022-06-02 RX ADMIN — LOSARTAN POTASSIUM 50 MG: 50 TABLET, FILM COATED ORAL at 08:06

## 2022-06-02 RX ADMIN — DOXYCYCLINE HYCLATE 100 MG: 100 TABLET, COATED ORAL at 08:06

## 2022-06-02 RX ADMIN — LOSARTAN POTASSIUM 50 MG: 50 TABLET, FILM COATED ORAL at 10:06

## 2022-06-02 NOTE — PLAN OF CARE
[facility]  Discharge Final Note    Primary Care Provider: Primary Doctor No    Expected Discharge Date: 6/2/2022    Final Discharge Note (most recent)     Final Note - 06/02/22 1014        Final Note    Assessment Type Final Discharge Note     Anticipated Discharge Disposition Home or Self Care     Hospital Resources/Appts/Education Provided Provided patient/caregiver with written discharge plan information;Appointments scheduled and added to AVS        Post-Acute Status    Discharge Delays None known at this time                 Important Message from Medicare             Contact Info     No, Primary Doctor   Relationship: PCP - General        Next Steps: Follow up        Patient to [place] home with self care. CM attempted to scheduled patients FU appt, patient stated he has no primary provider. Patient says he just moved to [place] and doesn't have a PCP yet. Cm attempted to establish patient with a PCP, patient stated he doesn't remember his address, cm was unable to locate provider close to patients home due to patient not knowing his address. Patient will schedule FU upon arrival home. No other cm needs.

## 2022-06-02 NOTE — PLAN OF CARE
Problem: Adult Inpatient Plan of Care  Goal: Plan of Care Review  Outcome: Ongoing, Progressing  Goal: Patient-Specific Goal (Individualized)  Outcome: Ongoing, Progressing  Goal: Absence of Hospital-Acquired Illness or Injury  Outcome: Ongoing, Progressing  Goal: Optimal Comfort and Wellbeing  Outcome: Ongoing, Progressing  Goal: Readiness for Transition of Care  Outcome: Ongoing, Progressing     Problem: Adjustment to Illness (Sepsis/Septic Shock)  Goal: Optimal Coping  Outcome: Ongoing, Progressing     Problem: Infection Progression (Sepsis/Septic Shock)  Goal: Absence of Infection Signs and Symptoms  Outcome: Ongoing, Progressing     Problem: Fluid and Electrolyte Imbalance (Acute Kidney Injury/Impairment)  Goal: Fluid and Electrolyte Balance  Outcome: Ongoing, Progressing     Problem: Renal Function Impairment (Acute Kidney Injury/Impairment)  Goal: Effective Renal Function  Outcome: Ongoing, Progressing

## 2022-06-02 NOTE — PLAN OF CARE
[facility]  Discharge Assessment    Primary Care Provider: Primary Doctor No     Discharge Assessment (most recent)     BRIEF DISCHARGE ASSESSMENT - 06/02/22 1013        Discharge Planning    Assessment Type Discharge Planning Brief Assessment     Resource/Environmental Concerns none     Support Systems Parent     Equipment Currently Used at Home none     Current Living Arrangements home/apartment/condo     Patient/Family Anticipates Transition to home     Patient/Family Anticipated Services at Transition none     DME Needed Upon Discharge  none     Discharge Plan A Home     Discharge Plan B Home                   Brief assessment completed with patient. Patient reports independence with ADL's  prior to hospitalization. Patient uses no DME at home. Patient currently has no cm needs upon DC. CM will continue following to assist with other needs.   CM provided information on advanced directives, information on pharmacy bedside delivery, and discharge planning begins on admission with contact information for any needs/questions.

## 2022-06-02 NOTE — HOSPITAL COURSE
Patient was admitted for AMS. Neuro consulted on case. MRI performed showed possible old strokes. Discussed that memory impairment may be due to chronic alcoholism. Will continue on thiamine supplementation. Losartan was increased to 100mg for adequate bp control. Recommended outpatient f/u with pcp.

## 2022-06-02 NOTE — DISCHARGE SUMMARY
[facility]  Discharge Summary      Patient Name: lC Saunders  MRN: [id number]  Patient Class: IP- Inpatient  Admission Date: 5/31/2022  Hospital Length of Stay: 1 days  Discharge Date and Time: 6/2/2022 10:43 AM  Attending Physician: Barbie att. providers found   Discharging Provider: David Mueller MD  Primary Care Provider: Primary Doctor Barbie      HPI:   Cl Saunders is a 41 year old male who presented to ED by father for further evaluation of confusion and forgetfulness. He was discharged from the hospital after being treated for drug overdose, acute on chronic respiratory failure related to aspiration pneumonia, and alcohol intoxication. Patient was discharged in stable condition, but returned last night due to periods of confusion and memory loss. Patient can answer questions appropriately, but will often repeat some questions. He denies recreational drug use or ETOH since discharge. He does remember much from his hospital stay. Can follow all commands.    In ED, work up unremarkable other then some forgetfulness. MRI Brain negative for acute process but shows chronic infarct. He was evaluated by neurology in ED who recommended a formal neurology consult.     He was not able to  antibiotics from pharmacy.       * No surgery found *      Hospital Course:   Patient was admitted for AMS. Neuro consulted on case. MRI performed showed possible old strokes. Discussed that memory impairment may be due to chronic alcoholism. Will continue on thiamine supplementation. Losartan was increased to 100mg for adequate bp control. Recommended outpatient f/u with pcp.        Goals of Care Treatment Preferences:  Code Status: Full Code      Consults:   Consults (From admission, onward)        Status Ordering Provider     Inpatient consult to Telemedicine-General Neurology  Once        Provider:  Praful Leyva MD    Completed ANGELICA ARIAS          No new Assessment & Plan notes have been filed  under this hospital service since the last note was generated.  Service: Hospital Medicine    Final Active Diagnoses:    Diagnosis Date Noted POA    PRINCIPAL PROBLEM:  Encephalopathy [G93.40] 06/01/2022 Yes    ETOH abuse [F10.10] 06/01/2022 Yes    Morbid obesity with BMI of 40.0-44.9, adult [E66.01, Z68.41] 05/29/2022 Not Applicable    Aspiration pneumonia [J69.0] 05/29/2022 Yes      Problems Resolved During this Admission:       Discharged Condition: good    Disposition: Home or Self Care    Follow Up:   Follow-up Information     Primary Doctor No Follow up.                     Patient Instructions:   No discharge procedures on file.    Significant Diagnostic Studies: Labs:   BMP:   Recent Labs   Lab 05/31/22 2039 06/02/22  0712    85    140   K 4.1 3.3*    104   CO2 25 23   BUN 9 10   CREATININE 0.9 0.8   CALCIUM 8.9 8.8    and CBC   Recent Labs   Lab 05/31/22 2039 06/02/22  0712   WBC 4.49 4.50   HGB 13.6* 14.3   HCT 39.4* 41.2   PLT 72* 105*       Pending Diagnostic Studies:     None         Medications:  Reconciled Home Medications:      Medication List      START taking these medications    amoxicillin-clavulanate 875-125mg 875-125 mg per tablet  Commonly known as: AUGMENTIN  Take 1 tablet by mouth every 12 (twelve) hours. for 7 days     thiamine 100 MG tablet  Commonly known as: VITAMIN B-1  Take 1 tablet (100 mg total) by mouth once daily. for 14 days        CHANGE how you take these medications    losartan 50 MG tablet  Commonly known as: COZAAR  Take 2 tablets (100 mg total) by mouth once daily.  What changed: how much to take            Indwelling Lines/Drains at time of discharge:   Lines/Drains/Airways     None                 Time spent on the discharge of patient: 36 minutes         David Mueller MD  Department of Hospital Medicine  [facility]

## 2022-06-02 NOTE — PLAN OF CARE
Pt remains free from falls/injuries this shift. Safety precautions maintained. Pain managed with pain medication. No s/s of acute distress noted. VSS. Tele monitoring per orders. Will continue to monitor. Chart check completed.

## 2022-06-04 LAB
BACTERIA BLD CULT: NORMAL
BACTERIA BLD CULT: NORMAL

## 2023-10-16 ENCOUNTER — HOSPITAL ENCOUNTER (OUTPATIENT)
Dept: RADIOLOGY | Facility: HOSPITAL | Age: 43
Discharge: HOME | End: 2023-10-16
Attending: NURSE PRACTITIONER
Payer: MEDICAID

## 2023-10-16 DIAGNOSIS — K74.00 LIVER FIBROSIS: ICD-10-CM

## 2023-10-16 PROCEDURE — 76705 ECHO EXAM OF ABDOMEN: CPT | Mod: TC,PO

## 2023-10-16 PROCEDURE — 76705 ECHO EXAM OF ABDOMEN: CPT | Mod: 26,,, | Performed by: RADIOLOGY

## 2023-10-16 PROCEDURE — 76705 US ABDOMEN LIMITED: ICD-10-PCS | Mod: 26,,, | Performed by: RADIOLOGY

## 2025-05-13 DIAGNOSIS — Z01.89 ENCOUNTER FOR OTHER SPECIFIED SPECIAL EXAMINATIONS: Primary | ICD-10-CM

## 2025-05-16 ENCOUNTER — HOSPITAL ENCOUNTER (OUTPATIENT)
Dept: RADIOLOGY | Facility: HOSPITAL | Age: 45
Discharge: HOME | End: 2025-05-16
Attending: NURSE PRACTITIONER
Payer: OTHER GOVERNMENT

## 2025-05-16 DIAGNOSIS — Z01.89 ENCOUNTER FOR OTHER SPECIFIED SPECIAL EXAMINATIONS: ICD-10-CM

## 2025-05-16 PROCEDURE — 76700 US EXAM ABDOM COMPLETE: CPT | Mod: TC

## 2025-05-16 PROCEDURE — 76700 US EXAM ABDOM COMPLETE: CPT | Mod: 26,,, | Performed by: RADIOLOGY
